# Patient Record
Sex: MALE | Race: BLACK OR AFRICAN AMERICAN | Employment: UNEMPLOYED | URBAN - METROPOLITAN AREA
[De-identification: names, ages, dates, MRNs, and addresses within clinical notes are randomized per-mention and may not be internally consistent; named-entity substitution may affect disease eponyms.]

---

## 2018-01-20 ENCOUNTER — APPOINTMENT (OUTPATIENT)
Dept: GENERAL RADIOLOGY | Age: 83
DRG: 557 | End: 2018-01-20
Payer: MEDICARE

## 2018-01-20 ENCOUNTER — HOSPITAL ENCOUNTER (INPATIENT)
Age: 83
LOS: 6 days | Discharge: NURSING, REHAB OR LONG-TERM CARE | DRG: 557 | End: 2018-01-26
Attending: EMERGENCY MEDICINE | Admitting: INTERNAL MEDICINE
Payer: MEDICARE

## 2018-01-20 ENCOUNTER — APPOINTMENT (OUTPATIENT)
Dept: CT IMAGING | Age: 83
DRG: 557 | End: 2018-01-20
Payer: MEDICARE

## 2018-01-20 DIAGNOSIS — M62.82 NON-TRAUMATIC RHABDOMYOLYSIS: Primary | ICD-10-CM

## 2018-01-20 PROBLEM — G93.40 ENCEPHALOPATHY: Status: ACTIVE | Noted: 2018-01-20

## 2018-01-20 PROBLEM — N17.9 ACUTE KIDNEY INJURY (HCC): Status: ACTIVE | Noted: 2018-01-20

## 2018-01-20 PROBLEM — J84.10 PULMONARY FIBROSIS (HCC): Status: ACTIVE | Noted: 2018-01-20

## 2018-01-20 LAB
% CKMB: 0.3 % (ref 0–3.5)
-: ABNORMAL
ABSOLUTE EOS #: 0 K/UL (ref 0–0.4)
ABSOLUTE IMMATURE GRANULOCYTE: ABNORMAL K/UL (ref 0–0.3)
ABSOLUTE LYMPH #: 0.3 K/UL (ref 1–4.8)
ABSOLUTE MONO #: 0.1 K/UL (ref 0.2–0.8)
AMMONIA: 29 UMOL/L (ref 16–60)
AMORPHOUS: ABNORMAL
ANION GAP SERPL CALCULATED.3IONS-SCNC: 18 MMOL/L (ref 9–17)
BACTERIA: ABNORMAL
BASOPHILS # BLD: 0 % (ref 0–2)
BASOPHILS ABSOLUTE: 0 K/UL (ref 0–0.2)
BILIRUBIN URINE: ABNORMAL
BNP INTERPRETATION: ABNORMAL
BUN BLDV-MCNC: 29 MG/DL (ref 8–23)
BUN/CREAT BLD: 20 (ref 9–20)
CALCIUM SERPL-MCNC: 8.9 MG/DL (ref 8.6–10.4)
CASTS UA: ABNORMAL /LPF
CHLORIDE BLD-SCNC: 100 MMOL/L (ref 98–107)
CK MB: 21 NG/ML
CKMB INTERPRETATION: ABNORMAL
CO2: 21 MMOL/L (ref 20–31)
COLOR: ABNORMAL
COMMENT UA: ABNORMAL
COMPLEMENT C3: 82 MG/DL (ref 90–180)
COMPLEMENT C4: 18 MG/DL (ref 10–40)
CREAT SERPL-MCNC: 1.47 MG/DL (ref 0.7–1.2)
CRYSTALS, UA: ABNORMAL /HPF
DIFFERENTIAL TYPE: ABNORMAL
DIRECT EXAM: NORMAL
EKG ATRIAL RATE: 101 BPM
EKG P AXIS: -8 DEGREES
EKG P-R INTERVAL: 120 MS
EKG Q-T INTERVAL: 332 MS
EKG QRS DURATION: 78 MS
EKG QTC CALCULATION (BAZETT): 430 MS
EKG R AXIS: 76 DEGREES
EKG T AXIS: 41 DEGREES
EKG VENTRICULAR RATE: 101 BPM
EOSINOPHILS RELATIVE PERCENT: 0 % (ref 1–4)
EPITHELIAL CELLS UA: ABNORMAL /HPF (ref 0–5)
FIO2: 26
FIO2: 28
GFR AFRICAN AMERICAN: 55 ML/MIN
GFR NON-AFRICAN AMERICAN: 45 ML/MIN
GFR SERPL CREATININE-BSD FRML MDRD: ABNORMAL ML/MIN/{1.73_M2}
GFR SERPL CREATININE-BSD FRML MDRD: ABNORMAL ML/MIN/{1.73_M2}
GLUCOSE BLD-MCNC: 116 MG/DL (ref 75–110)
GLUCOSE BLD-MCNC: 151 MG/DL (ref 70–99)
GLUCOSE URINE: NEGATIVE
HCO3 VENOUS: 20.7 MMOL/L (ref 24–30)
HCT VFR BLD CALC: 37.5 % (ref 41–53)
HEMOGLOBIN: 12.3 G/DL (ref 13.5–17.5)
IMMATURE GRANULOCYTES: ABNORMAL %
KETONES, URINE: NEGATIVE
LEUKOCYTE ESTERASE, URINE: NEGATIVE
LYMPHOCYTES # BLD: 11 % (ref 24–44)
Lab: NORMAL
MCH RBC QN AUTO: 31.3 PG (ref 26–34)
MCHC RBC AUTO-ENTMCNC: 32.7 G/DL (ref 31–37)
MCV RBC AUTO: 95.8 FL (ref 80–100)
MONOCYTES # BLD: 6 % (ref 1–7)
MUCUS: ABNORMAL
MYOGLOBIN: 3201 NG/ML (ref 28–72)
NEGATIVE BASE EXCESS, ART: 5 (ref 0–2)
NEGATIVE BASE EXCESS, VEN: 4 (ref 0–2)
NITRITE, URINE: NEGATIVE
NRBC AUTOMATED: ABNORMAL PER 100 WBC
O2 DEVICE/FLOW/%: ABNORMAL
O2 DEVICE/FLOW/%: ABNORMAL
O2 SAT, VEN: 80 %
OTHER OBSERVATIONS UA: ABNORMAL
PATIENT TEMP: 37
PATIENT TEMP: ABNORMAL
PCO2, VEN: 32 MM HG (ref 39–55)
PDW BLD-RTO: 14 % (ref 11.5–14.5)
PH UA: 5.5 (ref 5–8)
PH VENOUS: 7.42 (ref 7.32–7.42)
PLATELET # BLD: 170 K/UL (ref 130–400)
PLATELET ESTIMATE: ABNORMAL
PMV BLD AUTO: 7.4 FL (ref 6–12)
PO2, VEN: 43 MM HG (ref 30–50)
POC HCO3: 19.4 MMOL/L (ref 22–27)
POC O2 SATURATION: 96 %
POC PCO2 TEMP: ABNORMAL MM HG
POC PCO2 TEMP: ABNORMAL MM HG
POC PCO2: 29 MM HG (ref 32–45)
POC PH TEMP: ABNORMAL
POC PH TEMP: ABNORMAL
POC PH: 7.44 (ref 7.35–7.45)
POC PO2 TEMP: ABNORMAL MM HG
POC PO2 TEMP: ABNORMAL MM HG
POC PO2: 80 MM HG (ref 75–95)
POSITIVE BASE EXCESS, ART: ABNORMAL (ref 0–2)
POSITIVE BASE EXCESS, VEN: ABNORMAL (ref 0–2)
POTASSIUM SERPL-SCNC: 4.2 MMOL/L (ref 3.7–5.3)
PRO-BNP: 1166 PG/ML
PROCALCITONIN: 2.16 NG/ML
PROTEIN UA: ABNORMAL
RBC # BLD: 3.92 M/UL (ref 4.5–5.9)
RBC # BLD: ABNORMAL 10*6/UL
RBC UA: ABNORMAL /HPF (ref 0–2)
RENAL EPITHELIAL, UA: ABNORMAL /HPF
SEG NEUTROPHILS: 83 % (ref 36–66)
SEGMENTED NEUTROPHILS ABSOLUTE COUNT: 2.1 K/UL (ref 1.8–7.7)
SODIUM BLD-SCNC: 139 MMOL/L (ref 135–144)
SPECIFIC GRAVITY UA: 1.02 (ref 1–1.03)
SPECIMEN DESCRIPTION: NORMAL
STATUS: NORMAL
TCO2 (CALC), ART: 20 MMOL/L (ref 23–28)
TOTAL CK: 6491 U/L (ref 39–308)
TOTAL CO2, VENOUS: 22 MMOL/L (ref 25–31)
TRICHOMONAS: ABNORMAL
TROPONIN INTERP: NORMAL
TROPONIN T: <0.03 NG/ML
TSH SERPL DL<=0.05 MIU/L-ACNC: 0.35 MIU/L (ref 0.3–5)
TURBIDITY: ABNORMAL
URINE HGB: ABNORMAL
UROBILINOGEN, URINE: NORMAL
WBC # BLD: 2.5 K/UL (ref 3.5–11)
WBC # BLD: ABNORMAL 10*3/UL
WBC UA: ABNORMAL /HPF (ref 0–5)
YEAST: ABNORMAL

## 2018-01-20 PROCEDURE — 2580000003 HC RX 258: Performed by: EMERGENCY MEDICINE

## 2018-01-20 PROCEDURE — 94640 AIRWAY INHALATION TREATMENT: CPT

## 2018-01-20 PROCEDURE — 84156 ASSAY OF PROTEIN URINE: CPT

## 2018-01-20 PROCEDURE — 82550 ASSAY OF CK (CPK): CPT

## 2018-01-20 PROCEDURE — 36415 COLL VENOUS BLD VENIPUNCTURE: CPT

## 2018-01-20 PROCEDURE — 83516 IMMUNOASSAY NONANTIBODY: CPT

## 2018-01-20 PROCEDURE — 99285 EMERGENCY DEPT VISIT HI MDM: CPT

## 2018-01-20 PROCEDURE — 82803 BLOOD GASES ANY COMBINATION: CPT

## 2018-01-20 PROCEDURE — 96365 THER/PROPH/DIAG IV INF INIT: CPT

## 2018-01-20 PROCEDURE — 80048 BASIC METABOLIC PNL TOTAL CA: CPT

## 2018-01-20 PROCEDURE — 82140 ASSAY OF AMMONIA: CPT

## 2018-01-20 PROCEDURE — 72125 CT NECK SPINE W/O DYE: CPT

## 2018-01-20 PROCEDURE — 6370000000 HC RX 637 (ALT 250 FOR IP): Performed by: INTERNAL MEDICINE

## 2018-01-20 PROCEDURE — 93005 ELECTROCARDIOGRAM TRACING: CPT

## 2018-01-20 PROCEDURE — 82947 ASSAY GLUCOSE BLOOD QUANT: CPT

## 2018-01-20 PROCEDURE — 84443 ASSAY THYROID STIM HORMONE: CPT

## 2018-01-20 PROCEDURE — 51701 INSERT BLADDER CATHETER: CPT

## 2018-01-20 PROCEDURE — 6360000002 HC RX W HCPCS: Performed by: EMERGENCY MEDICINE

## 2018-01-20 PROCEDURE — 36600 WITHDRAWAL OF ARTERIAL BLOOD: CPT

## 2018-01-20 PROCEDURE — 71045 X-RAY EXAM CHEST 1 VIEW: CPT

## 2018-01-20 PROCEDURE — 86160 COMPLEMENT ANTIGEN: CPT

## 2018-01-20 PROCEDURE — 2060000000 HC ICU INTERMEDIATE R&B

## 2018-01-20 PROCEDURE — 2500000003 HC RX 250 WO HCPCS: Performed by: EMERGENCY MEDICINE

## 2018-01-20 PROCEDURE — 71250 CT THORAX DX C-: CPT

## 2018-01-20 PROCEDURE — 70450 CT HEAD/BRAIN W/O DYE: CPT

## 2018-01-20 PROCEDURE — 96367 TX/PROPH/DG ADDL SEQ IV INF: CPT

## 2018-01-20 PROCEDURE — 87804 INFLUENZA ASSAY W/OPTIC: CPT

## 2018-01-20 PROCEDURE — 84300 ASSAY OF URINE SODIUM: CPT

## 2018-01-20 PROCEDURE — 83880 ASSAY OF NATRIURETIC PEPTIDE: CPT

## 2018-01-20 PROCEDURE — 84145 PROCALCITONIN (PCT): CPT

## 2018-01-20 PROCEDURE — 84484 ASSAY OF TROPONIN QUANT: CPT

## 2018-01-20 PROCEDURE — 81001 URINALYSIS AUTO W/SCOPE: CPT

## 2018-01-20 PROCEDURE — 87086 URINE CULTURE/COLONY COUNT: CPT

## 2018-01-20 PROCEDURE — 82570 ASSAY OF URINE CREATININE: CPT

## 2018-01-20 PROCEDURE — 87040 BLOOD CULTURE FOR BACTERIA: CPT

## 2018-01-20 PROCEDURE — 86038 ANTINUCLEAR ANTIBODIES: CPT

## 2018-01-20 PROCEDURE — 83874 ASSAY OF MYOGLOBIN: CPT

## 2018-01-20 PROCEDURE — 85025 COMPLETE CBC W/AUTO DIFF WBC: CPT

## 2018-01-20 PROCEDURE — 99223 1ST HOSP IP/OBS HIGH 75: CPT | Performed by: INTERNAL MEDICINE

## 2018-01-20 PROCEDURE — 82553 CREATINE MB FRACTION: CPT

## 2018-01-20 RX ORDER — ALBUTEROL SULFATE 90 UG/1
2 AEROSOL, METERED RESPIRATORY (INHALATION)
Status: DISCONTINUED | OUTPATIENT
Start: 2018-01-20 | End: 2018-01-20

## 2018-01-20 RX ORDER — DORZOLAMIDE HYDROCHLORIDE AND TIMOLOL MALEATE 20; 5 MG/ML; MG/ML
1 SOLUTION/ DROPS OPHTHALMIC 3 TIMES DAILY
COMMUNITY

## 2018-01-20 RX ORDER — BRIMONIDINE TARTRATE 2 MG/ML
1 SOLUTION/ DROPS OPHTHALMIC 3 TIMES DAILY
COMMUNITY

## 2018-01-20 RX ORDER — 0.9 % SODIUM CHLORIDE 0.9 %
500 INTRAVENOUS SOLUTION INTRAVENOUS ONCE
Status: COMPLETED | OUTPATIENT
Start: 2018-01-20 | End: 2018-01-20

## 2018-01-20 RX ORDER — LISINOPRIL 20 MG/1
20 TABLET ORAL DAILY
COMMUNITY

## 2018-01-20 RX ORDER — IPRATROPIUM BROMIDE AND ALBUTEROL SULFATE 2.5; .5 MG/3ML; MG/3ML
1 SOLUTION RESPIRATORY (INHALATION)
Status: DISCONTINUED | OUTPATIENT
Start: 2018-01-20 | End: 2018-01-20

## 2018-01-20 RX ORDER — TIMOLOL MALEATE 5 MG/ML
1 SOLUTION/ DROPS OPHTHALMIC 3 TIMES DAILY
Status: DISCONTINUED | OUTPATIENT
Start: 2018-01-20 | End: 2018-01-26 | Stop reason: HOSPADM

## 2018-01-20 RX ORDER — ALBUTEROL SULFATE 2.5 MG/3ML
5 SOLUTION RESPIRATORY (INHALATION)
Status: DISCONTINUED | OUTPATIENT
Start: 2018-01-20 | End: 2018-01-21

## 2018-01-20 RX ORDER — DORZOLAMIDE HYDROCHLORIDE AND TIMOLOL MALEATE 20; 5 MG/ML; MG/ML
1 SOLUTION/ DROPS OPHTHALMIC 3 TIMES DAILY
Status: DISCONTINUED | OUTPATIENT
Start: 2018-01-20 | End: 2018-01-20 | Stop reason: CLARIF

## 2018-01-20 RX ORDER — BRIMONIDINE TARTRATE 2 MG/ML
1 SOLUTION/ DROPS OPHTHALMIC 3 TIMES DAILY
Status: DISCONTINUED | OUTPATIENT
Start: 2018-01-20 | End: 2018-01-26 | Stop reason: HOSPADM

## 2018-01-20 RX ORDER — ASPIRIN 81 MG/1
81 TABLET ORAL DAILY
COMMUNITY

## 2018-01-20 RX ORDER — TAMSULOSIN HYDROCHLORIDE 0.4 MG/1
0.4 CAPSULE ORAL DAILY
Status: DISCONTINUED | OUTPATIENT
Start: 2018-01-20 | End: 2018-01-26 | Stop reason: HOSPADM

## 2018-01-20 RX ORDER — LATANOPROST 50 UG/ML
1 SOLUTION/ DROPS OPHTHALMIC NIGHTLY
Status: DISCONTINUED | OUTPATIENT
Start: 2018-01-20 | End: 2018-01-26 | Stop reason: HOSPADM

## 2018-01-20 RX ORDER — ASPIRIN 81 MG/1
81 TABLET ORAL DAILY
Status: DISCONTINUED | OUTPATIENT
Start: 2018-01-20 | End: 2018-01-25

## 2018-01-20 RX ORDER — LATANOPROST 50 UG/ML
1 SOLUTION/ DROPS OPHTHALMIC NIGHTLY
COMMUNITY

## 2018-01-20 RX ORDER — TAMSULOSIN HYDROCHLORIDE 0.4 MG/1
0.4 CAPSULE ORAL DAILY
COMMUNITY

## 2018-01-20 RX ORDER — SODIUM CHLORIDE 9 MG/ML
INJECTION, SOLUTION INTRAVENOUS CONTINUOUS
Status: DISCONTINUED | OUTPATIENT
Start: 2018-01-20 | End: 2018-01-26

## 2018-01-20 RX ORDER — GUAIFENESIN 200 MG/1
400 TABLET ORAL 3 TIMES DAILY PRN
Status: ON HOLD | COMMUNITY
End: 2018-01-26 | Stop reason: HOSPADM

## 2018-01-20 RX ORDER — DORZOLAMIDE HCL 20 MG/ML
1 SOLUTION/ DROPS OPHTHALMIC 3 TIMES DAILY
Status: DISCONTINUED | OUTPATIENT
Start: 2018-01-20 | End: 2018-01-26 | Stop reason: HOSPADM

## 2018-01-20 RX ORDER — 0.9 % SODIUM CHLORIDE 0.9 %
1000 INTRAVENOUS SOLUTION INTRAVENOUS ONCE
Status: COMPLETED | OUTPATIENT
Start: 2018-01-20 | End: 2018-01-20

## 2018-01-20 RX ADMIN — SODIUM CHLORIDE 125 ML/HR: 9 INJECTION, SOLUTION INTRAVENOUS at 17:00

## 2018-01-20 RX ADMIN — ALBUTEROL SULFATE 2.5 MG: 5 SOLUTION RESPIRATORY (INHALATION) at 14:13

## 2018-01-20 RX ADMIN — VITAMIN D, TAB 1000IU (100/BT) 1000 UNITS: 25 TAB at 23:35

## 2018-01-20 RX ADMIN — LATANOPROST 1 DROP: 50 SOLUTION OPHTHALMIC at 23:40

## 2018-01-20 RX ADMIN — AZITHROMYCIN MONOHYDRATE 500 MG: 500 INJECTION, POWDER, LYOPHILIZED, FOR SOLUTION INTRAVENOUS at 16:08

## 2018-01-20 RX ADMIN — SODIUM CHLORIDE 1000 ML: 9 INJECTION, SOLUTION INTRAVENOUS at 15:02

## 2018-01-20 RX ADMIN — BRIMONIDINE TARTRATE 1 DROP: 2 SOLUTION OPHTHALMIC at 23:37

## 2018-01-20 RX ADMIN — ASPIRIN 81 MG: 81 TABLET, COATED ORAL at 23:35

## 2018-01-20 RX ADMIN — ALBUTEROL SULFATE 5 MG: 5 SOLUTION RESPIRATORY (INHALATION) at 14:04

## 2018-01-20 RX ADMIN — TAMSULOSIN HYDROCHLORIDE 0.4 MG: 0.4 CAPSULE ORAL at 23:34

## 2018-01-20 RX ADMIN — CEFTRIAXONE SODIUM 1 G: 10 INJECTION, POWDER, FOR SOLUTION INTRAVENOUS at 16:07

## 2018-01-20 RX ADMIN — TIMOLOL MALEATE 1 DROP: 5 SOLUTION OPHTHALMIC at 23:35

## 2018-01-20 RX ADMIN — DORZOLAMIDE HYDROCHLORIDE 1 DROP: 20 SOLUTION/ DROPS OPHTHALMIC at 23:38

## 2018-01-20 RX ADMIN — SODIUM CHLORIDE: 9 INJECTION, SOLUTION INTRAVENOUS at 21:24

## 2018-01-20 RX ADMIN — SODIUM CHLORIDE 500 ML: 0.9 INJECTION, SOLUTION INTRAVENOUS at 13:51

## 2018-01-20 ASSESSMENT — PAIN SCALES - GENERAL: PAINLEVEL_OUTOF10: 0

## 2018-01-20 NOTE — FLOWSHEET NOTE
Chaplain Clinton Olvera in ED. Pt laying in bed awoke. Pt was able to speak, state his name. His daughter Lashell Trivedi at Bedside. Daughter states she believers her father have pneumonia and wasn't doing good earlier at home.  prayed with Pt and daughter, provided ministry of active listening and presences.  shared scripture with Pt/dustyugher. Pt's daughter thanked . Spiritual Care will be available to offer emotional and spiritual support as needed. 01/20/18 1620   Encounter Summary   Services provided to: Patient and family together   Referral/Consult From: [address] (1.20.18)   Complexity of Encounter Moderate   Length of Encounter 15 minutes   Spiritual Assessment Completed Yes   Routine   Type Initial   Assessment Approachable;Calm; Hopeful;Coping   Intervention Active listening;Explored feelings, thoughts, concerns;Explored coping resources;Nurtured hope;Prayer;Sustaining presence/ Ministry of presence   Outcome Expressed gratitude;Engaged in conversation;Receptive;Comfort

## 2018-01-20 NOTE — ED TRIAGE NOTES
Pt to ED by ambulance, stretcher to rm 26. Pt sent from urgent care for hypotension. Pt AOx4. Pt went to urgent care for cough and being found on floor by daughter. On scene 80/50. On arrival to /70. C/o lethergy as well. Slight dyspnea, patent airway, no cyanosis noted. Bed to lowest position, side rails up x2, call light within reach. Daughter at bedside.

## 2018-01-20 NOTE — ED PROVIDER NOTES
[facility] ED  eMERGENCY dEPARTMENT eNCOUnter      Pt Name: Guicho Murrieta  MRN: [id number]  [place] 2/9/1930  Date of evaluation: 1/20/2018  Provider: Blayne Prieto MD    [address] [place] [address]       Chief Complaint   Patient presents with    Cough    Hypotension         HISTORY OF PRESENT ILLNESS   (Location/Symptom, Timing/Onset, Context/Setting, Quality, Duration, Modifying Factors, Severity)  Note limiting factors. Guicho Murrieta is a 80 y.o. male who presents to the emergency department By EMS for evaluation off cough and possible hypotension. Patient's daughter is the major historian. She states that he lives with her and had been coughing a lot yesterday but towards the evening she did not hear him cough. This morning when she walked into his bedroom he was laying on the floor. They took him to an urgent care center where he was found to be hypotensive and was directed to the emergency department. The history is provided by a relative, medical records and the EMS personnel. Nursing Notes were reviewed. REVIEW OF SYSTEMS    (2-9 systems for level 4, 10 or more for level 5)     Review of Systems   Unable to perform ROS: Mental status change       Except as noted above the remainder of the review of systems was reviewed and negative. PAST MEDICAL HISTORY     Past Medical History:   Diagnosis Date    Cerebral artery occlusion with cerebral infarction ([facility] [address]     tia    Diabetes mellitus ([facility] [address]     Glaucoma     Hypertension          SURGICAL HISTORY       Past Surgical History:   Procedure Laterality Date    EYE SURGERY           CURRENT MEDICATIONS       Previous Medications    ASPIRIN 81 MG EC TABLET    Take 81 mg by mouth daily    BRIMONIDINE (ALPHAGAN) 0.2 % OPHTHALMIC SOLUTION    Place 1 drop into the left eye 3 times daily    CAMPHOR-MENTHOL (SARNA) 0.5-0.5 % LOTION    Apply topically 3 times daily as needed for Itching Apply topically as needed.     DORZOLAMIDE-TIMOLOL (COSOPT) DIFFERENTIAL - Abnormal; Notable for the following:     WBC 2.5 (*)     RBC 3.92 (*)     Hemoglobin 12.3 (*)     Hematocrit 37.5 (*)     Seg Neutrophils 83 (*)     Lymphocytes 11 (*)     Eosinophils % 0 (*)     Absolute Lymph # 0.30 (*)     Absolute Mono # 0.10 (*)     All other components within normal limits   BRAIN NATRIURETIC PEPTIDE - Abnormal; Notable for the following:     Pro-BNP 1,166 (*)     All other components within normal limits   MYOGLOBIN, SERUM - Abnormal; Notable for the following:     Myoglobin 3,201 (*)     All other components within normal limits   CK ISOENZYMES - Abnormal; Notable for the following: Total CK 6,491 (*)     CK-MB 21.0 (*)     All other components within normal limits   PROCALCITONIN - Abnormal; Notable for the following:     Procalcitonin 2.16 (*)     All other components within normal limits   POC PANEL (G3)-JUANY - Abnormal; Notable for the following:     pCO2, Juany 32 (*)     Total CO2, Venous 22 (*)     HCO3, Venous 20.7 (*)     Negative Base Excess, Juany 4 (*)     All other components within normal limits   RAPID INFLUENZA A/B ANTIGENS   CULTURE BLOOD #1   CULTURE BLOOD #1   TROPONIN   AMMONIA   UA W/REFLEX CULTURE       All other labs were within normal range or not returned as of this dictation. EMERGENCY DEPARTMENT COURSE and DIFFERENTIAL DIAGNOSIS/MDM:   Vitals:    Vitals:    01/20/18 1326 01/20/18 1448   BP: 139/71 (!) 105/58   Pulse: 95 123   Resp: 24 18   Temp: 97.7 °F (36.5 °C) 100 °F (37.8 °C)   TempSrc: Oral Rectal   SpO2: 90% 98%   Weight: 147 lb (66.7 kg)        Chest x-ray is consistent with pulmonary fibrosis which shows up on the CT scan no infiltrate is reported. CT scan of the head and cervical spine did not reveal any acute findings. Patient's myoglobin and CPK levels are elevated consistent with skeletal muscle injury and he is started on IV fluid hydration. The plan is to use incentive for the catheter to closely monitor his urine output. Addis Méndez MD  01/20/18 5509

## 2018-01-20 NOTE — H&P
Department of Internal Medicine  Attending History and Physical        CHIEF COMPLAINT: cough    History Obtained From:  Patient/records    HISTORY OF PRESENT ILLNESS:    Patient presented to the ER and was subsequently admitted for rhabdomyolysis, MAXINE, pulmonary fibrosis. Pt states that he had been episodes of confusion and when I asked that he was found on the floor by family member, he could not recall what happened. He does state that he did not have any chest pains or palpitations and denied any headaches. Pt states that he has had episodes of confusion for quiet sometime. Also knows that he has had chronic lung problems but not aware of diagnosis. Past Medical/Surgical History:  Past Medical History:   Diagnosis Date    Cerebral artery occlusion with cerebral infarction ([facility] [address]     tia    Diabetes mellitus ([facility] [address]     Glaucoma     Hypertension          Past Surgical History:   Procedure Laterality Date    EYE SURGERY         No current facility-administered medications on file prior to encounter. No current outpatient prescriptions on file prior to encounter. Allergies:  Review of patient's allergies indicates no known allergies. Social History:   Social History     Social History    Marital status: Single     Spouse name: N/A    Number of children: N/A    Years of education: N/A     Occupational History    Not on file. Social History Main Topics    Smoking status: Former Smoker    Smokeless tobacco: Never Used    Alcohol use No    Drug use: No    Sexual activity: Not on file     Other Topics Concern    Not on file     Social History Narrative    No narrative on file       Family History:   Patient unable to provide. REVIEW OF SYSTEMS:  Patient unable to provide meaningful information. PHYSICAL EXAM:  Vitals:  BP (!) 105/58   Pulse 123   Temp 100 °F (37.8 °C) (Rectal)   Resp 18   Wt 147 lb (66.7 kg)   SpO2 98%     General Appearance: somewhat confused.  Keeps

## 2018-01-21 LAB
ALPHA-1 ANTITRYPSIN: 150 MG/DL (ref 90–200)
ANION GAP SERPL CALCULATED.3IONS-SCNC: 13 MMOL/L (ref 9–17)
BUN BLDV-MCNC: 29 MG/DL (ref 8–23)
BUN/CREAT BLD: 19 (ref 9–20)
CALCIUM SERPL-MCNC: 7.8 MG/DL (ref 8.6–10.4)
CHLORIDE BLD-SCNC: 106 MMOL/L (ref 98–107)
CO2: 20 MMOL/L (ref 20–31)
CREAT SERPL-MCNC: 1.53 MG/DL (ref 0.7–1.2)
CREATININE URINE: 225.4 MG/DL (ref 39–259)
CULTURE: NO GROWTH
CULTURE: NORMAL
GFR AFRICAN AMERICAN: 52 ML/MIN
GFR NON-AFRICAN AMERICAN: 43 ML/MIN
GFR SERPL CREATININE-BSD FRML MDRD: ABNORMAL ML/MIN/{1.73_M2}
GFR SERPL CREATININE-BSD FRML MDRD: ABNORMAL ML/MIN/{1.73_M2}
GLUCOSE BLD-MCNC: 107 MG/DL (ref 70–99)
Lab: NORMAL
MYOGLOBIN: 1675 NG/ML (ref 28–72)
POTASSIUM SERPL-SCNC: 4 MMOL/L (ref 3.7–5.3)
SODIUM BLD-SCNC: 139 MMOL/L (ref 135–144)
SODIUM,UR: 30 MMOL/L
SPECIMEN DESCRIPTION: NORMAL
SPECIMEN DESCRIPTION: NORMAL
STATUS: NORMAL
TOTAL CK: 4189 U/L (ref 39–308)
TOTAL PROTEIN, URINE: 128 MG/DL

## 2018-01-21 PROCEDURE — 6370000000 HC RX 637 (ALT 250 FOR IP): Performed by: NURSE PRACTITIONER

## 2018-01-21 PROCEDURE — 82103 ALPHA-1-ANTITRYPSIN TOTAL: CPT

## 2018-01-21 PROCEDURE — 82104 ALPHA-1-ANTITRYPSIN PHENO: CPT

## 2018-01-21 PROCEDURE — 99231 SBSQ HOSP IP/OBS SF/LOW 25: CPT | Performed by: INTERNAL MEDICINE

## 2018-01-21 PROCEDURE — 6360000002 HC RX W HCPCS: Performed by: NURSE PRACTITIONER

## 2018-01-21 PROCEDURE — 36415 COLL VENOUS BLD VENIPUNCTURE: CPT

## 2018-01-21 PROCEDURE — 2580000003 HC RX 258: Performed by: NURSE PRACTITIONER

## 2018-01-21 PROCEDURE — 2580000003 HC RX 258: Performed by: INTERNAL MEDICINE

## 2018-01-21 PROCEDURE — 94760 N-INVAS EAR/PLS OXIMETRY 1: CPT

## 2018-01-21 PROCEDURE — 83874 ASSAY OF MYOGLOBIN: CPT

## 2018-01-21 PROCEDURE — 82550 ASSAY OF CK (CPK): CPT

## 2018-01-21 PROCEDURE — 6370000000 HC RX 637 (ALT 250 FOR IP): Performed by: INTERNAL MEDICINE

## 2018-01-21 PROCEDURE — 94640 AIRWAY INHALATION TREATMENT: CPT

## 2018-01-21 PROCEDURE — 2060000000 HC ICU INTERMEDIATE R&B

## 2018-01-21 PROCEDURE — 80048 BASIC METABOLIC PNL TOTAL CA: CPT

## 2018-01-21 RX ORDER — ALBUTEROL SULFATE 2.5 MG/3ML
2.5 SOLUTION RESPIRATORY (INHALATION)
Status: DISCONTINUED | OUTPATIENT
Start: 2018-01-21 | End: 2018-01-21

## 2018-01-21 RX ORDER — CODEINE PHOSPHATE AND GUAIFENESIN 10; 100 MG/5ML; MG/5ML
5 SOLUTION ORAL EVERY 4 HOURS PRN
Status: DISCONTINUED | OUTPATIENT
Start: 2018-01-21 | End: 2018-01-26 | Stop reason: HOSPADM

## 2018-01-21 RX ORDER — ALBUTEROL SULFATE 2.5 MG/3ML
2.5 SOLUTION RESPIRATORY (INHALATION) EVERY 6 HOURS PRN
Status: DISCONTINUED | OUTPATIENT
Start: 2018-01-21 | End: 2018-01-26 | Stop reason: HOSPADM

## 2018-01-21 RX ADMIN — CEFTRIAXONE SODIUM 1 G: 10 INJECTION, POWDER, FOR SOLUTION INTRAVENOUS at 21:07

## 2018-01-21 RX ADMIN — VITAMIN D, TAB 1000IU (100/BT) 1000 UNITS: 25 TAB at 09:39

## 2018-01-21 RX ADMIN — BRIMONIDINE TARTRATE 1 DROP: 2 SOLUTION OPHTHALMIC at 21:14

## 2018-01-21 RX ADMIN — DORZOLAMIDE HYDROCHLORIDE 1 DROP: 20 SOLUTION/ DROPS OPHTHALMIC at 21:14

## 2018-01-21 RX ADMIN — DORZOLAMIDE HYDROCHLORIDE 1 DROP: 20 SOLUTION/ DROPS OPHTHALMIC at 09:39

## 2018-01-21 RX ADMIN — BRIMONIDINE TARTRATE 1 DROP: 2 SOLUTION OPHTHALMIC at 06:30

## 2018-01-21 RX ADMIN — MOMETASONE FUROATE AND FORMOTEROL FUMARATE DIHYDRATE 2 PUFF: 200; 5 AEROSOL RESPIRATORY (INHALATION) at 21:05

## 2018-01-21 RX ADMIN — GUAIFENESIN AND CODEINE PHOSPHATE 5 ML: 10; 100 LIQUID ORAL at 17:40

## 2018-01-21 RX ADMIN — AZITHROMYCIN MONOHYDRATE 500 MG: 500 INJECTION, POWDER, LYOPHILIZED, FOR SOLUTION INTRAVENOUS at 17:39

## 2018-01-21 RX ADMIN — TIMOLOL MALEATE 1 DROP: 5 SOLUTION OPHTHALMIC at 21:14

## 2018-01-21 RX ADMIN — TIMOLOL MALEATE 1 DROP: 5 SOLUTION OPHTHALMIC at 14:15

## 2018-01-21 RX ADMIN — TIMOLOL MALEATE 1 DROP: 5 SOLUTION OPHTHALMIC at 09:39

## 2018-01-21 RX ADMIN — LATANOPROST 1 DROP: 50 SOLUTION OPHTHALMIC at 21:14

## 2018-01-21 RX ADMIN — TAMSULOSIN HYDROCHLORIDE 0.4 MG: 0.4 CAPSULE ORAL at 09:38

## 2018-01-21 RX ADMIN — ASPIRIN 81 MG: 81 TABLET, COATED ORAL at 09:38

## 2018-01-21 RX ADMIN — SODIUM CHLORIDE: 9 INJECTION, SOLUTION INTRAVENOUS at 09:49

## 2018-01-21 RX ADMIN — DORZOLAMIDE HYDROCHLORIDE 1 DROP: 20 SOLUTION/ DROPS OPHTHALMIC at 14:15

## 2018-01-21 RX ADMIN — BRIMONIDINE TARTRATE 1 DROP: 2 SOLUTION OPHTHALMIC at 14:15

## 2018-01-21 RX ADMIN — SODIUM CHLORIDE: 9 INJECTION, SOLUTION INTRAVENOUS at 17:40

## 2018-01-21 NOTE — PROGRESS NOTES
Telephone call from Dr. Kirit Vines.  Confirmed that bladder scan is not necessary as pt has vivar in place.

## 2018-01-21 NOTE — CONSULTS
Pulmonary Medicine and [address] Karthik, BRYCE / Dr. Inna Pastrana M.D. Patient Barb Marr   MRN -  [id number]   Acct # - [de-identified]   - 1930      Date of Admission -  2018  1:25 PM  Date of evaluation -  2018  Room - [facility] [address], MD Primary Care Physician - No primary care provider on file. Reason for Consult    Chronic Respiratory failure / Pulmonary Fibrosis / Emphysema     Assessment   · Chronic respiratory failure  · COPD/Severe bolus emphysema  · Pulmonary fibrosis  · MAXINE on CKD  · Rhabdomyolysis  · HTN, DM II    Recommendations   · 2 liters/min via nasal cannula  · Continue IV antibiotics  · Albuterol and Ipratropium Q 4 hours and prn  · Dulera 200  · Robitussin for cough   · Check Alpha 1 with phenotype   · X-ray chest in am  · Labs: CBC and BMP in am  · Nephrology following  · DVT prophylaxis with low molecular weight heparin  · Home O2 evaluation prior to discharge  · Will follow with you    Problem List      Patient Active Problem List   Diagnosis    Rhabdomyolysis    Pulmonary fibrosis ([facility] [address]    Acute kidney injury ([facility] [address]    Encephalopathy       WARREN Ram is 80 y.o.,  male, admitted after being discovered on the floor by his daughter. He has some confusion at this time, therefore information is primarily obtained from the patient chart and his daughter is at the bedside. He has had an increased cough for quite some time with large amount of sputum production. He denies any significant shortness of breath. He denies chest pain. CT of the shot significant for severe pulmonary fibrosis/severe bolus emphysema. According to the patient and his daughter, he has minimal smoking history, smoking only while he was in the service and quitting over 60 years ago. 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   It is unclear if he has been on home oxygen before, though a second daughter tells me on the phone that he has had oxygen at home before. He currently does not follow with a pulmonologist.    PMHx   Past Medical History      Diagnosis Date    Cerebral artery occlusion with cerebral infarction ([facility] [address]     tia    Diabetes mellitus ([facility] [address]     Glaucoma     Hypertension       Past Surgical History        Procedure Laterality Date    EYE SURGERY         Meds    Current Medications    brimonidine  1 drop Left Eye TID    aspirin  81 mg Oral Daily    latanoprost  1 drop Both Eyes Nightly    tamsulosin  0.4 mg Oral Daily    vitamin D  1,000 Units Oral Daily    dorzolamide  1 drop Both Eyes TID    And    timolol  1 drop Both Eyes TID       IV Drips/Infusions   sodium chloride 100 mL/hr at 01/21/18 1339     Home Medications  Prescriptions Prior to Admission: lisinopril (PRINIVIL;ZESTRIL) 20 MG tablet, Take 20 mg by mouth daily  aspirin 81 MG EC tablet, Take 81 mg by mouth daily  guaiFENesin 200 MG tablet, Take 400 mg by mouth 3 times daily as needed for Congestion  brimonidine (ALPHAGAN) 0.2 % ophthalmic solution, Place 1 drop into the left eye 3 times daily  dorzolamide-timolol (COSOPT) 22.3-6.8 MG/ML ophthalmic solution, Place 1 drop into both eyes 3 times daily  latanoprost (XALATAN) 0.005 % ophthalmic solution, Place 1 drop into both eyes nightly  tamsulosin (FLOMAX) 0.4 MG capsule, Take 0.4 mg by mouth daily  camphor-menthol (SARNA) 0.5-0.5 % lotion, Apply topically 3 times daily as needed for Itching Apply topically as needed. vitamin D (CHOLECALCIFEROL) 1000 UNIT TABS tablet, Take 1,000 Units by mouth daily    Allergies    Review of patient's allergies indicates no known allergies.   Social History     Social History     Social History    Marital status: Single     Spouse name: N/A    Number of children: 11    Years of education: 8     Occupational History    retired      Social History Main Topics    Smoking status: Former Smoker    Smokeless tobacco: records and notes have been reviewed in [facility]   CBC   Lab Results   Component Value Date    WBC 2.5 01/20/2018    RBC 3.92 01/20/2018    HGB 12.3 01/20/2018    HCT 37.5 01/20/2018     01/20/2018    MCV 95.8 01/20/2018    MCH 31.3 01/20/2018    MCHC 32.7 01/20/2018    RDW 14.0 01/20/2018    LYMPHOPCT 11 01/20/2018    MONOPCT 6 01/20/2018    BASOPCT 0 01/20/2018    MONOSABS 0.10 01/20/2018    LYMPHSABS 0.30 01/20/2018    EOSABS 0.00 01/20/2018    BASOSABS 0.00 01/20/2018    DIFFTYPE NOT REPORTED 01/20/2018     BMP Lab Results   Component Value Date     01/21/2018    K 4.0 01/21/2018     01/21/2018    CO2 20 01/21/2018    BUN 29 01/21/2018    CREATININE 1.53 01/21/2018    GLUCOSE 107 01/21/2018    CALCIUM 7.8 01/21/2018         Radiology    CXR       CT Scans      (See actual reports for details)    \"Thank you for asking us to see this patient\"    Case discussed with nurse and patient/family. Questions and concerns addressed. Electronically signed by     Laura Lovell CNP on 1/21/2018 at 3:16 PM  Patient was seen under the supervision of Dr. Claudean Irvine and Sleep Medicine,    Patient seen and evaluated by me. He is 80years old black male with history of atherosclerotic artery vessel disease/diabetes mellitus/hypertension who was admitted for cough/rhabdomyolysis. He denies chest pain, fever, hemoptysis. He has smoked briefly in the past and also has worked in the uninfected for long time. Lung exam reveals moderate air exchange with bilateral basal crackles. But no significant wheeze. CT scan report and films were reviewed. Plan is him to continue IV antibiotics, bronchodilators, Dulera. Obtain alpha-1 antitrypsin level. He'll benefit from home oxygen evaluation of the time of discharge. Plan was discussed with patient and his daughter. Above was reviewed and discussed with Lyndon Adam CNP. And I agree with assessment and plan of care.   Electronically

## 2018-01-21 NOTE — CONSULTS
NOSE:  No nasal discharge. THROAT:  Oral cavity and pharynx normal. Moist  CARDIAC: Normal S1 and S2. No S3, S4 or murmurs. Rhythm is regular. LUNGS: Positive rhonchi, wheezing  diminished breath sounds. NECK: Neck supple, non-tender without lymphadenopathy, masses or thyromegaly. JVD-neg  BACK: Examination of the spine reveals normal gait and posture, no spinal deformity, symmetry of spinal muscles, without tenderness, decreased range of motion or muscular spasm  MUSKULOSKELETAL: Adequately aligned spine. No joint erythema or tenderness. EXTREMITIES: No edema. Peripheral pulses intact. NEURO: Nonfocal      Labs:   CBC:  Recent Labs      01/20/18   1400   WBC  2.5*   RBC  3.92*   HGB  12.3*   HCT  37.5*   MCV  95.8   MCH  31.3   MCHC  32.7   RDW  14.0   PLT  170   MPV  7.4      BMP: Recent Labs      01/20/18   1400  01/21/18   0439   NA  139  139   K  4.2  4.0   CL  100  106   CO2  21  20   BUN  29*  29*   CREATININE  1.47*  1.53*   GLUCOSE  151*  107*   CALCIUM  8.9  7.8*      Phosphorus:  No results for input(s): PHOS in the last 72 hours. Magnesium: No results for input(s): MG in the last 72 hours. Albumin: No results for input(s): LABALBU in the last 72 hours. IRON:  No results for input(s): IRON in the last 72 hours. Iron Saturation:  Invalid input(s): PERCENTFE  TIBC:  No results for input(s): TIBC in the last 72 hours. FERRITIN:  No results for input(s): FERRITIN in the last 72 hours. SPEP: No results for input(s): SPEP in the last 72 hours. No results for input(s): PROT, ALBCAL, ALBCAL, ALBPCT, LABALPH, A1PCT, LABALPH, A2PCT, LABBETA, BETAPCT, GAMGLOB, GGPCT, PATH in the last 72 hours. UPEP: No results for input(s): TPU in the last 72 hours. Urine Sodium:    Recent Labs      01/20/18   2354   TEOFILO  30      Urine Potassium: No results for input(s): KUR in the last 72 hours. Urine Chloride:   Invalid input(s): CLU  Urine Ph:  Invalid input(s): PO4U  Urine Osmolarity: No results for

## 2018-01-22 ENCOUNTER — APPOINTMENT (OUTPATIENT)
Dept: MRI IMAGING | Age: 83
DRG: 557 | End: 2018-01-22
Payer: MEDICARE

## 2018-01-22 ENCOUNTER — APPOINTMENT (OUTPATIENT)
Dept: GENERAL RADIOLOGY | Age: 83
DRG: 557 | End: 2018-01-22
Payer: MEDICARE

## 2018-01-22 ENCOUNTER — APPOINTMENT (OUTPATIENT)
Dept: ULTRASOUND IMAGING | Age: 83
DRG: 557 | End: 2018-01-22
Payer: MEDICARE

## 2018-01-22 PROBLEM — G93.89 ENCEPHALOMALACIA ON IMAGING STUDY: Status: ACTIVE | Noted: 2018-01-22

## 2018-01-22 LAB
ABSOLUTE EOS #: 0.07 K/UL (ref 0–0.4)
ABSOLUTE IMMATURE GRANULOCYTE: ABNORMAL K/UL (ref 0–0.3)
ABSOLUTE LYMPH #: 0.49 K/UL (ref 1–4.8)
ABSOLUTE MONO #: 0.22 K/UL (ref 0.2–0.8)
ABSOLUTE RETIC #: 0.02 M/UL (ref 0.03–0.08)
ANION GAP SERPL CALCULATED.3IONS-SCNC: 10 MMOL/L (ref 9–17)
ANTI-NUCLEAR ANTIBODY (ANA): POSITIVE
BASOPHILS # BLD: 0 %
BASOPHILS ABSOLUTE: 0 K/UL (ref 0–0.2)
BUN BLDV-MCNC: 26 MG/DL (ref 8–23)
BUN/CREAT BLD: 20 (ref 9–20)
CALCIUM IONIZED: 1.17 MMOL/L (ref 1.13–1.33)
CALCIUM SERPL-MCNC: 7.5 MG/DL (ref 8.6–10.4)
CHLORIDE BLD-SCNC: 107 MMOL/L (ref 98–107)
CO2: 19 MMOL/L (ref 20–31)
CREAT SERPL-MCNC: 1.31 MG/DL (ref 0.7–1.2)
DIFFERENTIAL TYPE: ABNORMAL
EOSINOPHILS RELATIVE PERCENT: 5 % (ref 1–4)
FERRITIN: 788 UG/L (ref 30–400)
FOLATE: 14.2 NG/ML
FREE KAPPA/LAMBDA RATIO: 7.15 (ref 0.26–1.65)
GFR AFRICAN AMERICAN: >60 ML/MIN
GFR NON-AFRICAN AMERICAN: 52 ML/MIN
GFR SERPL CREATININE-BSD FRML MDRD: ABNORMAL ML/MIN/{1.73_M2}
GFR SERPL CREATININE-BSD FRML MDRD: ABNORMAL ML/MIN/{1.73_M2}
GLUCOSE BLD-MCNC: 106 MG/DL (ref 70–99)
HCT VFR BLD CALC: 27.9 % (ref 41–53)
HEMOGLOBIN: 9.5 G/DL (ref 13.5–17.5)
HIV AG/AB: NONREACTIVE
IGA: 159 MG/DL (ref 70–400)
IGG: 886 MG/DL (ref 700–1600)
IGM: 82 MG/DL (ref 40–230)
IMMATURE GRANULOCYTES: ABNORMAL %
IMMATURE RETIC FRACT: 5.5 % (ref 2.7–18.3)
IRON SATURATION: 28 % (ref 20–55)
IRON: 27 UG/DL (ref 59–158)
KAPPA FREE LIGHT CHAINS QNT: 26.04 MG/DL (ref 0.37–1.94)
LACTATE DEHYDROGENASE: 403 U/L (ref 135–225)
LAMBDA FREE LIGHT CHAINS QNT: 3.64 MG/DL (ref 0.57–2.63)
LV EF: 60 %
LVEF MODALITY: NORMAL
LYMPHOCYTES # BLD: 35 % (ref 24–44)
MCH RBC QN AUTO: 32 PG (ref 26–34)
MCHC RBC AUTO-ENTMCNC: 34 G/DL (ref 31–37)
MCV RBC AUTO: 93.9 FL (ref 80–100)
MONOCYTES # BLD: 16 % (ref 1–7)
MYOGLOBIN: 575 NG/ML (ref 28–72)
NRBC AUTOMATED: ABNORMAL PER 100 WBC
PDW BLD-RTO: 13 % (ref 11.5–14.5)
PLATELET # BLD: 167 K/UL (ref 130–400)
PLATELET ESTIMATE: ABNORMAL
PMV BLD AUTO: ABNORMAL FL (ref 6–12)
POTASSIUM SERPL-SCNC: 4.1 MMOL/L (ref 3.7–5.3)
RBC # BLD: 2.97 M/UL (ref 4.5–5.9)
RBC # BLD: ABNORMAL 10*6/UL
RETIC %: 0.5 % (ref 0.5–1.9)
RETIC HEMOGLOBIN: 28.1 PG (ref 28.2–35.7)
SEG NEUTROPHILS: 44 % (ref 36–66)
SEGMENTED NEUTROPHILS ABSOLUTE COUNT: 0.62 K/UL (ref 1.8–7.7)
SODIUM BLD-SCNC: 136 MMOL/L (ref 135–144)
TOTAL CK: 3597 U/L (ref 39–308)
TOTAL IRON BINDING CAPACITY: 97 UG/DL (ref 250–450)
UNSATURATED IRON BINDING CAPACITY: 70 UG/DL (ref 112–347)
VITAMIN B-12: 624 PG/ML (ref 232–1245)
WBC # BLD: 1.4 K/UL (ref 3.5–11)
WBC # BLD: ABNORMAL 10*3/UL

## 2018-01-22 PROCEDURE — 83550 IRON BINDING TEST: CPT

## 2018-01-22 PROCEDURE — 82784 ASSAY IGA/IGD/IGG/IGM EACH: CPT

## 2018-01-22 PROCEDURE — 6370000000 HC RX 637 (ALT 250 FOR IP): Performed by: NURSE PRACTITIONER

## 2018-01-22 PROCEDURE — 85045 AUTOMATED RETICULOCYTE COUNT: CPT

## 2018-01-22 PROCEDURE — 99222 1ST HOSP IP/OBS MODERATE 55: CPT | Performed by: INTERNAL MEDICINE

## 2018-01-22 PROCEDURE — 2700000000 HC OXYGEN THERAPY PER DAY

## 2018-01-22 PROCEDURE — 99233 SBSQ HOSP IP/OBS HIGH 50: CPT | Performed by: INTERNAL MEDICINE

## 2018-01-22 PROCEDURE — 36415 COLL VENOUS BLD VENIPUNCTURE: CPT

## 2018-01-22 PROCEDURE — 93306 TTE W/DOPPLER COMPLETE: CPT

## 2018-01-22 PROCEDURE — 94640 AIRWAY INHALATION TREATMENT: CPT

## 2018-01-22 PROCEDURE — 82746 ASSAY OF FOLIC ACID SERUM: CPT

## 2018-01-22 PROCEDURE — 83883 ASSAY NEPHELOMETRY NOT SPEC: CPT

## 2018-01-22 PROCEDURE — 6360000002 HC RX W HCPCS: Performed by: NURSE PRACTITIONER

## 2018-01-22 PROCEDURE — 93880 EXTRACRANIAL BILAT STUDY: CPT

## 2018-01-22 PROCEDURE — 70553 MRI BRAIN STEM W/O & W/DYE: CPT

## 2018-01-22 PROCEDURE — 2580000003 HC RX 258: Performed by: PSYCHIATRY & NEUROLOGY

## 2018-01-22 PROCEDURE — 76775 US EXAM ABDO BACK WALL LIM: CPT

## 2018-01-22 PROCEDURE — 86334 IMMUNOFIX E-PHORESIS SERUM: CPT

## 2018-01-22 PROCEDURE — 83540 ASSAY OF IRON: CPT

## 2018-01-22 PROCEDURE — 2500000003 HC RX 250 WO HCPCS: Performed by: NURSE PRACTITIONER

## 2018-01-22 PROCEDURE — 2060000000 HC ICU INTERMEDIATE R&B

## 2018-01-22 PROCEDURE — 82607 VITAMIN B-12: CPT

## 2018-01-22 PROCEDURE — A9579 GAD-BASE MR CONTRAST NOS,1ML: HCPCS | Performed by: PSYCHIATRY & NEUROLOGY

## 2018-01-22 PROCEDURE — 83615 LACTATE (LD) (LDH) ENZYME: CPT

## 2018-01-22 PROCEDURE — 82728 ASSAY OF FERRITIN: CPT

## 2018-01-22 PROCEDURE — 80048 BASIC METABOLIC PNL TOTAL CA: CPT

## 2018-01-22 PROCEDURE — 83874 ASSAY OF MYOGLOBIN: CPT

## 2018-01-22 PROCEDURE — 86747 PARVOVIRUS ANTIBODY: CPT

## 2018-01-22 PROCEDURE — 71045 X-RAY EXAM CHEST 1 VIEW: CPT

## 2018-01-22 PROCEDURE — 84155 ASSAY OF PROTEIN SERUM: CPT

## 2018-01-22 PROCEDURE — 86225 DNA ANTIBODY NATIVE: CPT

## 2018-01-22 PROCEDURE — 82550 ASSAY OF CK (CPK): CPT

## 2018-01-22 PROCEDURE — 2580000003 HC RX 258: Performed by: NURSE PRACTITIONER

## 2018-01-22 PROCEDURE — 85025 COMPLETE CBC W/AUTO DIFF WBC: CPT

## 2018-01-22 PROCEDURE — 6370000000 HC RX 637 (ALT 250 FOR IP): Performed by: INTERNAL MEDICINE

## 2018-01-22 PROCEDURE — 6360000004 HC RX CONTRAST MEDICATION: Performed by: PSYCHIATRY & NEUROLOGY

## 2018-01-22 PROCEDURE — 82330 ASSAY OF CALCIUM: CPT

## 2018-01-22 PROCEDURE — 95816 EEG AWAKE AND DROWSY: CPT

## 2018-01-22 PROCEDURE — 2580000003 HC RX 258: Performed by: INTERNAL MEDICINE

## 2018-01-22 PROCEDURE — 87389 HIV-1 AG W/HIV-1&-2 AB AG IA: CPT

## 2018-01-22 PROCEDURE — 86021 WBC ANTIBODY IDENTIFICATION: CPT

## 2018-01-22 PROCEDURE — 84165 PROTEIN E-PHORESIS SERUM: CPT

## 2018-01-22 RX ORDER — ACETAMINOPHEN 325 MG/1
650 TABLET ORAL EVERY 4 HOURS PRN
Status: DISCONTINUED | OUTPATIENT
Start: 2018-01-22 | End: 2018-01-26 | Stop reason: HOSPADM

## 2018-01-22 RX ORDER — SODIUM CHLORIDE 0.9 % (FLUSH) 0.9 %
10 SYRINGE (ML) INJECTION
Status: COMPLETED | OUTPATIENT
Start: 2018-01-22 | End: 2018-01-22

## 2018-01-22 RX ADMIN — BRIMONIDINE TARTRATE 1 DROP: 2 SOLUTION OPHTHALMIC at 09:25

## 2018-01-22 RX ADMIN — LATANOPROST 1 DROP: 50 SOLUTION OPHTHALMIC at 20:53

## 2018-01-22 RX ADMIN — TIMOLOL MALEATE 1 DROP: 5 SOLUTION OPHTHALMIC at 20:53

## 2018-01-22 RX ADMIN — SODIUM CHLORIDE: 9 INJECTION, SOLUTION INTRAVENOUS at 04:15

## 2018-01-22 RX ADMIN — TAMSULOSIN HYDROCHLORIDE 0.4 MG: 0.4 CAPSULE ORAL at 09:11

## 2018-01-22 RX ADMIN — SODIUM CHLORIDE: 9 INJECTION, SOLUTION INTRAVENOUS at 18:15

## 2018-01-22 RX ADMIN — MOMETASONE FUROATE AND FORMOTEROL FUMARATE DIHYDRATE 2 PUFF: 200; 5 AEROSOL RESPIRATORY (INHALATION) at 09:53

## 2018-01-22 RX ADMIN — DORZOLAMIDE HYDROCHLORIDE 1 DROP: 20 SOLUTION/ DROPS OPHTHALMIC at 20:53

## 2018-01-22 RX ADMIN — GUAIFENESIN AND CODEINE PHOSPHATE 5 ML: 10; 100 LIQUID ORAL at 09:36

## 2018-01-22 RX ADMIN — TIMOLOL MALEATE 1 DROP: 5 SOLUTION OPHTHALMIC at 09:11

## 2018-01-22 RX ADMIN — Medication 10 ML: at 15:27

## 2018-01-22 RX ADMIN — BRIMONIDINE TARTRATE 1 DROP: 2 SOLUTION OPHTHALMIC at 14:24

## 2018-01-22 RX ADMIN — AZITHROMYCIN MONOHYDRATE 500 MG: 500 INJECTION, POWDER, LYOPHILIZED, FOR SOLUTION INTRAVENOUS at 18:15

## 2018-01-22 RX ADMIN — GUAIFENESIN AND CODEINE PHOSPHATE 5 ML: 10; 100 LIQUID ORAL at 21:31

## 2018-01-22 RX ADMIN — VITAMIN D, TAB 1000IU (100/BT) 1000 UNITS: 25 TAB at 09:11

## 2018-01-22 RX ADMIN — SODIUM CHLORIDE: 9 INJECTION, SOLUTION INTRAVENOUS at 09:23

## 2018-01-22 RX ADMIN — DORZOLAMIDE HYDROCHLORIDE 1 DROP: 20 SOLUTION/ DROPS OPHTHALMIC at 09:11

## 2018-01-22 RX ADMIN — DORZOLAMIDE HYDROCHLORIDE 1 DROP: 20 SOLUTION/ DROPS OPHTHALMIC at 14:24

## 2018-01-22 RX ADMIN — GADOTERIDOL 14 ML: 279.3 INJECTION, SOLUTION INTRAVENOUS at 15:26

## 2018-01-22 RX ADMIN — MOMETASONE FUROATE AND FORMOTEROL FUMARATE DIHYDRATE 2 PUFF: 200; 5 AEROSOL RESPIRATORY (INHALATION) at 21:42

## 2018-01-22 RX ADMIN — ASPIRIN 81 MG: 81 TABLET, COATED ORAL at 09:11

## 2018-01-22 RX ADMIN — CEFTRIAXONE SODIUM 1 G: 10 INJECTION, POWDER, FOR SOLUTION INTRAVENOUS at 20:52

## 2018-01-22 RX ADMIN — TIMOLOL MALEATE 1 DROP: 5 SOLUTION OPHTHALMIC at 14:24

## 2018-01-22 NOTE — CONSULTS
[address] [facility] [address]                 [address] [place] [address]                                   CONSULTATION    PATIENT NAME: Alysa Orlando                      :        1930  MED REC NO:   [id number]                             ROOM:       0176  ACCOUNT NO:   [de-identified]                           ADMIT DATE: 2018  PROVIDER:     Jeff Valente DATE:  2018    HISTORY OF PRESENT ILLNESS:  This patient is an 27-year-old gentleman whom  I am asked to see in neurology consultation for advice and opinion  regarding dementia. The patient was found on the floor of his home by his  family. It is unknown how long he had been there. He had a cough and  hypotension. He was taken to [facility] and found to be  hypotensive and was directed to the emergency department. He had a cough. He does not remember much of anything else now. He did not bite his  tongue. He did not lose control of his bladder or bowel. He must have  been on the floor for some time. He tells he has rhabdomyolysis. PAST MEDICAL HISTORY:  Significant for cerebral artery occlusion with  cerebral infarction, diabetes, glaucoma, hypertension. Negative for cancer  or seizure. FAMILY HISTORY:  Noncontributory. REVIEW OF SYSTEMS:  He is unable to give review of systems. PERSONAL AND SOCIAL HISTORY:  He is single. He does not use alcohol,  tobacco, or street drugs. MEDICATIONS:  Include aspirin, Alphagan ophthalmic solution, Glucerna,  Cosopt, guaifenesin, latanoprost, lisinopril, tamsulosin, and vitamin D. ALLERGIES:  He has no known drug allergies. PHYSICAL EXAMINATION:  VITAL SIGNS:  His temperature is 97.7 degrees Fahrenheit, pulse 95,  respirations 24, blood pressure 139/71. CHEST:  Clear. HEART:  S1, S2.  NEUROLOGIC:  He is oriented to person only. His speech is fluent and  spontaneous. His skull is normocephalic and atraumatic.  His spine

## 2018-01-22 NOTE — PROGRESS NOTES
FEV1 Predicted Normal Values          Age                                     Height in Feet and Inches       Age                                     Height in Feet and Inches       4' 11\" 5' 1\" 5' 3\" 5' 5\" 5' 7\" 5' 9\" 5' 11\" 6' 1\"  4' 11\" 5' 1\" 5' 3\" 5' 5\" 5' 7\" 5' 9\" 5' 11\" 6' 1\"   42 - 45 2.49 2.66 2.84 3.03 3.22 3.42 3.62 3.83 42 - 45 2.82 3.03 3.26 3.49 3.72 3.96 4.22 4.47   46 - 49 2.40 2.57 2.76 2.94 3.14 3.33 3.54 3.75 46 - 49 2.70 2.92 3.14 3.37 3.61 3.85 4.10 4.36   50 - 53 2.31 2.48 2.66 2.85 3.04 3.24 3.45 3.66 50 - 53 2.58 2.80 3.02 3.25 3.49 3.73 3.98 4.24   54 - 57 2.21 2.38 2.57 2.75 2.95 3.14 3.35 3.56 54 - 57 2.46 2.67 2.89 3.12 3.36 3.60 3.85 4.11   58 - 61 2.10 2.28 2.46 2.65 2.84 3.04 3.24 3.45 58 - 61 2.32 2.54 2.76 2.99 3.23 3.47 3.72 3.98   62 - 65 1.99 2.17 2.35 2.54 2.73 2.93 3.13 3.34 62 - 65 2.19 2.40 2.62 2.85 3.09 3.33 3.58 3.84   66 - 69 1.88 2.05 2.23 2.42 2.61 2.81 3.02 3.23 66 - 69 2.04 2.26 2.48 2.71 2.95 3.19 3.44 3.70   70+ 1.82 1.99 2.17 2.36 2.55 2.75 2.95 3.16 70+ 1.97 2.19 2.41 2.64 2.87 3.12 3.37 3.62             Predicted Peak Expiratory Flow Rate                                       Height (in)  Female       Height (in) Male           Age 54 31 03 89 38 03 22 79 Age            20 659 113 879 112 576 268 952 555  40 97 68 46 68 68 70 59 76   25 337 352 366 381 396 411 426 441 25 447 476 505 533 562 591 619 648 677   30 329 344 359 374 389 404 419 434 30 437 466 494 523 552 580 609 638 667   35 322 337 351 366 381 396 411 426 35 426 455 484 512 541 570 598 627 657   40 314 329 344 359 374 389 404 419 40 416 445 473 502 531 559 588 617 647   45 307 322 336 351 366 381 396 411 45 405 434 463 491 520 549 577 606 636   50 299 314 329 344 359 374 389 404 50 395 424 452 481 510 538 567 596 625   55 292 307 321 336 351 366 381 396 55 384 413 442 470 499 528 556 585 615   60 284 299 314 329 344 359 374 389 60 374 403 431 460 489 517 546 575 605   65 277 292 306 321 336 351 366 381 65 363 392 421 449 478 038 740 189 700   38 640 735 082 044 647 159 794 293 84 700 474 498 470 942 466 784 017 583   75 261 274 289 305 319 334 348 364 75 344 372 400 429 458 487 515 544 573   80 253 266 282 296 312 327 342 356 80 335 362 390 419 448 [phone] 562

## 2018-01-22 NOTE — PROGRESS NOTES
gallops. Abdomen - soft, nontender, nondistended. Neurological - alert,normal speech, no focal findings. Musculoskeletal - no joint tenderness. Extremities - peripheral pulses normal, no pedal edema, negative Paco's. Labs:  Hematology:  Recent Labs      01/20/18   1400  01/22/18   0415   WBC  2.5*  1.4*   HGB  12.3*  9.5*   HCT  37.5*  27.9*   PLT  170  167     Chemistry:  Recent Labs      01/20/18   1400  01/21/18   0439  01/22/18   0415   NA  139  139  136   K  4.2  4.0  4.1   CL  100  106  107   CO2  21  20  19*   GLUCOSE  151*  107*  106*   BUN  29*  29*  26*   CREATININE  1.47*  1.53*  1.31*   CALCIUM  8.9  7.8*  7.5*     Recent Labs      01/20/18   1400  01/20/18   1442  01/20/18 2125  01/21/18   0439  01/22/18   0415  01/22/18   1208   PROT   --    --    --    --    --   4.9*   TSH   --    --   0.35   --    --    --    LDH   --    --    --    --    --   403*   AMMONIA   --   29   --    --    --    --    CKTOTAL  6,491*   --    --   4,189*  3,597*   --    CKMB  21.0*   --    --    --    --    --    EAMON   --    --   POSITIVE*   --    --    --      Echocardiogram: LV size normal. EF 60%. Moderate tricuspid regurgitation. Moderate pulmonary Hypertension. No pericardial effusion. Blood work: anemia, significant neutropenia. Abnormal Immunoglobulin profile. Await input from Hematology service. Talked to daughter - states that patient had a previous Dx of bleeding disorder but unsure of exact Dx. MRI: encephalomalacia, diffuse parenchymal volume loss with mild chronic white matter ischemic changes. Bilateral carotid artery stenosis (close to 55% on right, close to 40% on left). Total CK trending down. Discussed with daughter at bedside/expressed understanding.   Continuing current regimen            Electronically signed by Zion Bose MD on 1/22/2018 at 6:39 PM

## 2018-01-22 NOTE — PROGRESS NOTES
Telephone call from pt's dtr Gilberto Arroyo and completed the MRI screening. Updated on consult for Hematology and she states her father has always had a low WBC, usually around 2- 21/2, but no history of low platelets.

## 2018-01-22 NOTE — PROGRESS NOTES
input(s): TPU in the last 72 hours. Urinalysis:    Recent Labs      01/20/18   1720   NITRU  NEGATIVE   COLORU  DARK YELLOW*   PHUR  5.5   WBCUA  0 TO 2   RBCUA  20 TO 50   MUCUS  NOT REPORTED   TRICHOMONAS  NOT REPORTED   YEAST  NOT REPORTED   BACTERIA  MODERATE*   SPECGRAV  1.025   LEUKOCYTESUR  NEGATIVE   UROBILINOGEN  Normal   BILIRUBINUR  Presumptive positive. Unable to confirm due to unavailability of reagent. *   GLUCOSEU  NEGATIVE   [address]  NEGATIVE   AMORPHOUS  NOT REPORTED         Radiology:  Reviewed as available. Assessment:  1. MAXINE on ckd, most likely secondary to ATN due to rhabdomyolysis, scr improving  2. History of essential hypertension  3. shortness of breath cough CT chest finding of pulmonary fibrosis       Plan:  continue IV fluids  100 ml/per hour for 1 more day  Strict I's and O's    1. Continue to hold Hold lisinopril      Thank you for the consultation.   Sharyn Matnilla        Electronically signed by Sharyn Mantilla MD on 1/22/2018 at 1:06 PM

## 2018-01-22 NOTE — PLAN OF CARE
Problem: Nutrition  Goal: Optimal nutrition therapy  Nutrition Problem: Swallowing difficulty  Intervention: Food and/or Nutrient Delivery: Modify current diet, Start ONS  Nutritional Goals: PO intake to meet >75% of estimated kcal/protein needs, with good tolerance of diet consistency, glycemic control, management of renal labs  Outcome: Ongoing

## 2018-01-22 NOTE — PROGRESS NOTES
Monitoring    Nutrition Evaluation:   · Evaluation: Goals set   · Goals: PO intake to meet >75% of estimated kcal/protein needs, with good tolerance of diet consistency, glycemic control, management of renal labs    · Monitoring: Meal Intake, Supplement Intake, Diet Tolerance, Skin Integrity, Mental Status/Confusion, Weight, Pertinent Labs, Chewing/Swallowing    See Adult Nutrition Doc Flowsheet for more detail.      Electronically signed by Pedro Joseph RDN, ARRON on 1/22/18 at 4:30 PM    Contact Number: [phone]

## 2018-01-23 LAB
ABSOLUTE EOS #: 0.22 K/UL (ref 0–0.4)
ABSOLUTE IMMATURE GRANULOCYTE: ABNORMAL K/UL (ref 0–0.3)
ABSOLUTE LYMPH #: 0.34 K/UL (ref 1–4.8)
ABSOLUTE MONO #: 0.06 K/UL (ref 0.2–0.8)
ANCA MYELOPEROXIDASE: 1187 AU/ML
ANCA PROTEINASE 3: 65 AU/ML
ANION GAP SERPL CALCULATED.3IONS-SCNC: 7 MMOL/L (ref 9–17)
ANTI DNA DOUBLE STRANDED: 340 IU/ML
BASOPHILS # BLD: 0 %
BASOPHILS ABSOLUTE: 0 K/UL (ref 0–0.2)
BUN BLDV-MCNC: 22 MG/DL (ref 8–23)
BUN/CREAT BLD: 19 (ref 9–20)
CALCIUM SERPL-MCNC: 7.3 MG/DL (ref 8.6–10.4)
CHLORIDE BLD-SCNC: 110 MMOL/L (ref 98–107)
CO2: 21 MMOL/L (ref 20–31)
CREAT SERPL-MCNC: 1.17 MG/DL (ref 0.7–1.2)
DIFFERENTIAL TYPE: ABNORMAL
EOSINOPHILS RELATIVE PERCENT: 14 % (ref 1–4)
GFR AFRICAN AMERICAN: >60 ML/MIN
GFR NON-AFRICAN AMERICAN: 59 ML/MIN
GFR SERPL CREATININE-BSD FRML MDRD: ABNORMAL ML/MIN/{1.73_M2}
GFR SERPL CREATININE-BSD FRML MDRD: ABNORMAL ML/MIN/{1.73_M2}
GLUCOSE BLD-MCNC: 103 MG/DL (ref 70–99)
HCT VFR BLD CALC: 29.3 % (ref 41–53)
HEMOGLOBIN: 9.6 G/DL (ref 13.5–17.5)
IMMATURE GRANULOCYTES: ABNORMAL %
LYMPHOCYTES # BLD: 21 % (ref 24–44)
MCH RBC QN AUTO: 31.6 PG (ref 26–34)
MCHC RBC AUTO-ENTMCNC: 32.9 G/DL (ref 31–37)
MCV RBC AUTO: 96.1 FL (ref 80–100)
MONOCYTES # BLD: 4 % (ref 1–7)
MORPHOLOGY: ABNORMAL
MYOGLOBIN: 214 NG/ML (ref 28–72)
NRBC AUTOMATED: ABNORMAL PER 100 WBC
PATHOLOGIST REVIEW: NORMAL
PDW BLD-RTO: 14 % (ref 11.5–14.5)
PLATELET # BLD: 197 K/UL (ref 130–400)
PLATELET ESTIMATE: ABNORMAL
PMV BLD AUTO: 7.4 FL (ref 6–12)
POTASSIUM SERPL-SCNC: 4.5 MMOL/L (ref 3.7–5.3)
RBC # BLD: 3.05 M/UL (ref 4.5–5.9)
RBC # BLD: ABNORMAL 10*6/UL
SEG NEUTROPHILS: 61 % (ref 36–66)
SEGMENTED NEUTROPHILS ABSOLUTE COUNT: 0.98 K/UL (ref 1.8–7.7)
SODIUM BLD-SCNC: 138 MMOL/L (ref 135–144)
SURGICAL PATHOLOGY REPORT: NORMAL
TOTAL CK: 1335 U/L (ref 39–308)
WBC # BLD: 1.6 K/UL (ref 3.5–11)
WBC # BLD: ABNORMAL 10*3/UL

## 2018-01-23 PROCEDURE — 2580000003 HC RX 258: Performed by: NURSE PRACTITIONER

## 2018-01-23 PROCEDURE — 94640 AIRWAY INHALATION TREATMENT: CPT

## 2018-01-23 PROCEDURE — G8979 MOBILITY GOAL STATUS: HCPCS

## 2018-01-23 PROCEDURE — 2500000003 HC RX 250 WO HCPCS: Performed by: NURSE PRACTITIONER

## 2018-01-23 PROCEDURE — G8978 MOBILITY CURRENT STATUS: HCPCS

## 2018-01-23 PROCEDURE — 97535 SELF CARE MNGMENT TRAINING: CPT

## 2018-01-23 PROCEDURE — 99232 SBSQ HOSP IP/OBS MODERATE 35: CPT | Performed by: INTERNAL MEDICINE

## 2018-01-23 PROCEDURE — G8988 SELF CARE GOAL STATUS: HCPCS

## 2018-01-23 PROCEDURE — 2060000000 HC ICU INTERMEDIATE R&B

## 2018-01-23 PROCEDURE — 85025 COMPLETE CBC W/AUTO DIFF WBC: CPT

## 2018-01-23 PROCEDURE — 6370000000 HC RX 637 (ALT 250 FOR IP): Performed by: NURSE PRACTITIONER

## 2018-01-23 PROCEDURE — 6370000000 HC RX 637 (ALT 250 FOR IP): Performed by: INTERNAL MEDICINE

## 2018-01-23 PROCEDURE — 97530 THERAPEUTIC ACTIVITIES: CPT

## 2018-01-23 PROCEDURE — 6360000002 HC RX W HCPCS: Performed by: NURSE PRACTITIONER

## 2018-01-23 PROCEDURE — 97166 OT EVAL MOD COMPLEX 45 MIN: CPT

## 2018-01-23 PROCEDURE — G8987 SELF CARE CURRENT STATUS: HCPCS

## 2018-01-23 PROCEDURE — 36415 COLL VENOUS BLD VENIPUNCTURE: CPT

## 2018-01-23 PROCEDURE — 80048 BASIC METABOLIC PNL TOTAL CA: CPT

## 2018-01-23 PROCEDURE — 83874 ASSAY OF MYOGLOBIN: CPT

## 2018-01-23 PROCEDURE — 97110 THERAPEUTIC EXERCISES: CPT

## 2018-01-23 PROCEDURE — 82550 ASSAY OF CK (CPK): CPT

## 2018-01-23 PROCEDURE — 2700000000 HC OXYGEN THERAPY PER DAY

## 2018-01-23 PROCEDURE — 2580000003 HC RX 258: Performed by: INTERNAL MEDICINE

## 2018-01-23 PROCEDURE — 97162 PT EVAL MOD COMPLEX 30 MIN: CPT

## 2018-01-23 RX ORDER — BENZONATATE 100 MG/1
100 CAPSULE ORAL 3 TIMES DAILY PRN
Status: DISCONTINUED | OUTPATIENT
Start: 2018-01-23 | End: 2018-01-26 | Stop reason: HOSPADM

## 2018-01-23 RX ADMIN — TIMOLOL MALEATE 1 DROP: 5 SOLUTION OPHTHALMIC at 20:40

## 2018-01-23 RX ADMIN — TAMSULOSIN HYDROCHLORIDE 0.4 MG: 0.4 CAPSULE ORAL at 11:01

## 2018-01-23 RX ADMIN — BRIMONIDINE TARTRATE 1 DROP: 2 SOLUTION OPHTHALMIC at 20:40

## 2018-01-23 RX ADMIN — BRIMONIDINE TARTRATE 1 DROP: 2 SOLUTION OPHTHALMIC at 00:00

## 2018-01-23 RX ADMIN — BRIMONIDINE TARTRATE 1 DROP: 2 SOLUTION OPHTHALMIC at 06:24

## 2018-01-23 RX ADMIN — DORZOLAMIDE HYDROCHLORIDE 1 DROP: 20 SOLUTION/ DROPS OPHTHALMIC at 14:52

## 2018-01-23 RX ADMIN — LATANOPROST 1 DROP: 50 SOLUTION OPHTHALMIC at 20:40

## 2018-01-23 RX ADMIN — BENZONATATE 100 MG: 100 CAPSULE ORAL at 20:39

## 2018-01-23 RX ADMIN — TIMOLOL MALEATE 1 DROP: 5 SOLUTION OPHTHALMIC at 14:52

## 2018-01-23 RX ADMIN — DORZOLAMIDE HYDROCHLORIDE 1 DROP: 20 SOLUTION/ DROPS OPHTHALMIC at 20:39

## 2018-01-23 RX ADMIN — DORZOLAMIDE HYDROCHLORIDE 1 DROP: 20 SOLUTION/ DROPS OPHTHALMIC at 10:59

## 2018-01-23 RX ADMIN — AZITHROMYCIN MONOHYDRATE 500 MG: 500 INJECTION, POWDER, LYOPHILIZED, FOR SOLUTION INTRAVENOUS at 17:56

## 2018-01-23 RX ADMIN — MOMETASONE FUROATE AND FORMOTEROL FUMARATE DIHYDRATE 2 PUFF: 200; 5 AEROSOL RESPIRATORY (INHALATION) at 10:04

## 2018-01-23 RX ADMIN — BRIMONIDINE TARTRATE 1 DROP: 2 SOLUTION OPHTHALMIC at 14:52

## 2018-01-23 RX ADMIN — ASPIRIN 81 MG: 81 TABLET, COATED ORAL at 11:01

## 2018-01-23 RX ADMIN — SODIUM CHLORIDE: 9 INJECTION, SOLUTION INTRAVENOUS at 14:52

## 2018-01-23 RX ADMIN — MOMETASONE FUROATE AND FORMOTEROL FUMARATE DIHYDRATE 2 PUFF: 200; 5 AEROSOL RESPIRATORY (INHALATION) at 21:18

## 2018-01-23 RX ADMIN — TIMOLOL MALEATE 1 DROP: 5 SOLUTION OPHTHALMIC at 11:07

## 2018-01-23 RX ADMIN — VITAMIN D, TAB 1000IU (100/BT) 1000 UNITS: 25 TAB at 11:01

## 2018-01-23 RX ADMIN — CEFTRIAXONE SODIUM 1 G: 10 INJECTION, POWDER, FOR SOLUTION INTRAVENOUS at 20:40

## 2018-01-23 RX ADMIN — SODIUM CHLORIDE: 9 INJECTION, SOLUTION INTRAVENOUS at 04:59

## 2018-01-23 NOTE — CONSULTS
Today's Date: 1/22/2018  Patient Name: Any Andre  Date of admission: 1/20/2018  1:25 PM  Patient's age: 80 y.o., 2/9/1930  Admission Dx: Rhabdomyolysis [M62.82]    Reason for Consult: Leukopenia  Requesting Physician: Romero Campbell MD    CHIEF COMPLAINT:  Leukopenia    History Obtained From:  Pt and chart    HISTORY OF PRESENT ILLNESS:      This is a 79 YO male was admitted with cough, fall and rhabdomyolysis. Pt was found on floor by family member. No fever chills. He has history of pulmonary fibrosis. On admission he was noted to have MAXINE. With total CK more than 6000. Now trending downward. His WBC noted to be 2.5 and now dropped to 1.4. His HB 9.5 and has normal plts. He denied any unintentional wt loss. Past Medical History:   has a past medical history of Cerebral artery occlusion with cerebral infarction ([facility] [address] Diabetes mellitus ([facility] [address] Glaucoma; and Hypertension. Past Surgical History:   has a past surgical history that includes Eye surgery. Medications:    Prior to Admission medications    Medication Sig Start Date End Date Taking?  Authorizing Provider   lisinopril (PRINIVIL;ZESTRIL) 20 MG tablet Take 20 mg by mouth daily   Yes Historical Provider, MD   aspirin 81 MG EC tablet Take 81 mg by mouth daily   Yes Historical Provider, MD   guaiFENesin 200 MG tablet Take 400 mg by mouth 3 times daily as needed for Congestion   Yes Historical Provider, MD   brimonidine (ALPHAGAN) 0.2 % ophthalmic solution Place 1 drop into the left eye 3 times daily   Yes Historical Provider, MD   dorzolamide-timolol (COSOPT) 22.3-6.8 MG/ML ophthalmic solution Place 1 drop into both eyes 3 times daily   Yes Historical Provider, MD   latanoprost (XALATAN) 0.005 % ophthalmic solution Place 1 drop into both eyes nightly   Yes Historical Provider, MD   tamsulosin (FLOMAX) 0.4 MG capsule Take 0.4 mg by mouth daily   Yes Historical Provider, MD   camphor-menthol (SARNA) 0.5-0.5 % lotion Apply topically 3 times Romero Campbell MD   1 drop at 18       Allergies:  Review of patient's allergies indicates no known allergies. Social History:   reports that he has quit smoking. He has never used smokeless tobacco. He reports that he does not drink alcohol or use drugs. Family History: family history is not on file. REVIEW OF SYSTEMS:    Constitutional: ++fatigue, No fever or chills. No night sweats, no weight loss   Eyes: No eye discharge, double vision, or eye pain   HEENT: negative for sore mouth, sore throat, hoarseness and voice change   Respiratory: ++ cough , sputum, dyspnea, wheezing, hemoptysis, chest pain   Cardiovascular: negative for chest pain, dyspnea, palpitations, orthopnea, PND   Gastrointestinal: negative for nausea, vomiting, diarrhea, constipation, abdominal pain, Dysphagia, hematemesis and hematochezia   Genitourinary: negative for frequency, dysuria, nocturia, urinary incontinence, and hematuria   Integument: negative for rash, skin lesions, bruises.    Hematologic/Lymphatic: negative for easy bruising, bleeding, lymphadenopathy, or petechiae   Endocrine: negative for heat or cold intolerance,weight changes, change in bowel habits and hair loss   Musculoskeletal: negative for myalgias, arthralgias, pain, joint swelling,and bone pain   Neurological: negative for headaches, dizziness, seizures, weakness, numbness    PHYSICAL EXAM:      BP (!) 125/57   Pulse 76   Temp 98.1 °F (36.7 °C) (Oral)   Resp 20   Wt 147 lb (66.7 kg)   SpO2 95%    Temp (24hrs), Av.8 °F (37.1 °C), Min:97.9 °F (36.6 °C), Max:101.3 °F (38.5 °C)  General appearance - elderly, well appearing, no in pain or distress   Mental status - alert and cooperative   Eyes - pupils equal and reactive, extraocular eye movements intact   Ears - bilateral TM's and external ear canals normal   Mouth - mucous membranes moist, pharynx normal without lesions   Neck - supple, no significant adenopathy   Lymphatics - no palpable

## 2018-01-23 NOTE — PROGRESS NOTES
allergies. Social History:   reports that he has quit smoking. He has never used smokeless tobacco. He reports that he does not drink alcohol or use drugs. Family History: family history is not on file. REVIEW OF SYSTEMS:    Constitutional: ++fatigue, No fever or chills. No night sweats, no weight loss   Eyes: No eye discharge, double vision, or eye pain   HEENT: negative for sore mouth, sore throat, hoarseness and voice change   Respiratory: ++ cough , sputum, dyspnea, wheezing, hemoptysis, chest pain   Cardiovascular: negative for chest pain, dyspnea, palpitations, orthopnea, PND   Gastrointestinal: negative for nausea, vomiting, diarrhea, constipation, abdominal pain, Dysphagia, hematemesis and hematochezia   Genitourinary: negative for frequency, dysuria, nocturia, urinary incontinence, and hematuria   Integument: negative for rash, skin lesions, bruises.    Hematologic/Lymphatic: negative for easy bruising, bleeding, lymphadenopathy, or petechiae   Endocrine: negative for heat or cold intolerance,weight changes, change in bowel habits and hair loss   Musculoskeletal: negative for myalgias, arthralgias, pain, joint swelling,and bone pain   Neurological: negative for headaches, dizziness, seizures, weakness, numbness    PHYSICAL EXAM:      BP (!) 112/46   Pulse 75   Temp 97.9 °F (36.6 °C) (Oral)   Resp 16   Wt 147 lb (66.7 kg)   SpO2 100%    Temp (24hrs), Av °F (36.7 °C), Min:97.9 °F (36.6 °C), Max:98.2 °F (36.8 °C)  General appearance - elderly, well appearing, no in pain or distress   Mental status - alert and cooperative   Eyes - pupils equal and reactive, extraocular eye movements intact   Ears - bilateral TM's and external ear canals normal   Mouth - mucous membranes moist, pharynx normal without lesions   Neck - supple, no significant adenopathy   Lymphatics - no palpable lymphadenopathy, no hepatosplenomegaly   Chest - clear to auscultation, no wheezes, rales or rhonchi, symmetric air entry   Heart - normal rate, regular rhythm, normal S1, S2, no murmurs  Abdomen - soft, nontender, nondistended, no masses or organomegaly   Neurological - alert, oriented, normal speech, no focal findings or movement disorder noted   Musculoskeletal - no joint tenderness, deformity or swelling   Extremities - peripheral pulses normal, no pedal edema, no clubbing or cyanosis   Skin - normal coloration and turgor, no rashes, no suspicious skin lesions noted ,    DATA:    Labs:   CBC:   Recent Labs      01/20/18   1400  01/22/18   0415   WBC  2.5*  1.4*   HGB  12.3*  9.5*   HCT  37.5*  27.9*   PLT  170  167     BMP:   Recent Labs      01/22/18   0415  01/23/18   0450   NA  136  138   K  4.1  4.5   CO2  19*  21   BUN  26*  22   CREATININE  1.31*  1.17   LABGLOM  52*  59*   GLUCOSE  106*  103*     PT/INR: No results for input(s): PROTIME, INR in the last 72 hours. Ref. Range 1/22/2018 12:08   Ferritin Latest Ref Range: 30 - 400 ug/L 788 (H)   Iron Latest Ref Range: 59 - 158 ug/dL 27 (L)   Iron Saturation Latest Ref Range: 20 - 55 % 28   UIBC Latest Ref Range: 112 - 347 ug/dL 70 (L)   TIBC Latest Ref Range: 250 - 450 ug/dL 97 (L)   Folate Latest Ref Range: >4.8 ng/mL 14.2   Vitamin B-12 Latest Ref Range: 232 - 1245 pg/mL 624     IMAGING DATA:  Reviewed    Primary Problem  Rhabdomyolysis    Active Hospital Problems    Diagnosis Date Noted    Encephalomalacia on imaging study [G93.89] 01/22/2018    Neutropenia ([facility] [address] [D70.9]     Non-traumatic rhabdomyolysis [M62.82]     Rhabdomyolysis [M62.82] 01/20/2018    Pulmonary fibrosis ([facility] [address] [J84.10] 01/20/2018    Acute kidney injury ([facility] [address] [N17.9] 01/20/2018    Encephalopathy [G93.40] 01/20/2018     IMPRESSION:   1. Rhabdomyolysis  2. MAXINE  3. Leukopenia: He seems to have chronically low WBC at since 2014. WBC noted at that time 3.0. Possibility of low grade underlying BM disorder can not be ruled out  4. Anemia  5. Encephalopathy  6.   Fall    RECOMMENDATIONS:  1. I

## 2018-01-23 NOTE — PROGRESS NOTES
Department of Internal Medicine  Nephrology Zaynab Berry MD    Progress Note        SUBJECTIVE:  We are following this patient for the management of acute kidney injury and rhabdomyolysis. Luz Yanes is a 80 y.o. male with past medical history of Systemic hypertension an type 2 DM, who presented with complaints of cough and shortness of breath after being found on the floor by his daughter. At presentation, laboratory studies were remarkable for serum creatinine 1.4 mg/dL and elevated CPK 6491. Today, he is nonoliguric and comfortable at rest.    Physical Exam:    VITALS:  BP (!) 125/57   Pulse 76   Temp 98.1 °F (36.7 °C) (Oral)   Resp 20   Wt 147 lb (66.7 kg)   SpO2 95%   24HR INTAKE/OUTPUT:    Intake/Output Summary (Last 24 hours) at 01/23/18 0803  Last data filed at 01/23/18 0502   Gross per 24 hour   Intake             3717 ml   Output              910 ml   Net             2807 ml       Constitutional:  Awake and responsive; not in obvious distress. Cardiovascular/Edema:  S1-S2 no pericardial rub. Respiratory:  Findings bilateral basal rales. Gastrointestinal:  Soft with normal bowel sounds.       CBC:   Recent Labs      01/20/18   1400  01/22/18   0415   WBC  2.5*  1.4*   HGB  12.3*  9.5*   PLT  170  167     BMP:    Recent Labs      01/21/18   0439  01/22/18   0415  01/23/18   0450   NA  139  136  138   K  4.0  4.1  4.5   CL  106  107  110*   CO2  20  19*  21   BUN  29*  26*  22   CREATININE  1.53*  1.31*  1.17   GLUCOSE  107*  106*  103*       Lab Results   Component Value Date    NITRU NEGATIVE 01/20/2018    COLORU DARK YELLOW 01/20/2018    PHUR 5.5 01/20/2018    WBCUA 0 TO 2 01/20/2018    RBCUA 20 TO 50 01/20/2018    MUCUS NOT REPORTED 01/20/2018    TRICHOMONAS NOT REPORTED 01/20/2018    YEAST NOT REPORTED 01/20/2018    BACTERIA MODERATE 01/20/2018    SPECGRAV 1.025 01/20/2018    LEUKOCYTESUR NEGATIVE 01/20/2018    UROBILINOGEN Normal 01/20/2018    BILIRUBINUR  01/20/2018

## 2018-01-23 NOTE — PROGRESS NOTES
Justa Saini is a 80 y.o. male patient.     Current Facility-Administered Medications   Medication Dose Route Frequency Provider Last Rate Last Dose    acetaminophen (TYLENOL) tablet 650 mg  650 mg Oral Q4H PRN Cheyanne Miranda MD        guaiFENesin-codeine (GUAIFENESIN AC) 100-10 MG/5ML liquid 5 mL  5 mL Oral Q4H PRN Sally Square, CNP   5 mL at 01/22/18 2131    mometasone-formoterol (DULERA) 200-5 MCG/ACT inhaler 2 puff  2 puff Inhalation BID Sally Square, CNP   2 puff at 01/23/18 1004    azithromycin (ZITHROMAX) 500 mg in D5W 250ml addavial  500 mg Intravenous Q24H Sally Square, CNP   Stopped at 01/22/18 1915    cefTRIAXone (ROCEPHIN) 1 g in sterile water 10 mL IV syringe  1 g Intravenous Q24H Sally Square, CNP   1 g at 01/22/18 2052    albuterol (PROVENTIL) nebulizer solution 2.5 mg  2.5 mg Nebulization Q6H PRN Dusty Gill MD        0.9 % sodium chloride infusion   Intravenous Continuous Alireza Martinez  mL/hr at 01/23/18 0459      brimonidine (ALPHAGAN) 0.2 % ophthalmic solution 1 drop  1 drop Left Eye TID Cheyanne Miranda MD   1 drop at 01/23/18 6361    aspirin EC tablet 81 mg  81 mg Oral Daily Liz Sheriff MD   81 mg at 01/23/18 1101    latanoprost (XALATAN) 0.005 % ophthalmic solution 1 drop  1 drop Both Eyes Nightly Cheyanne Miranda MD   1 drop at 01/22/18 2053    tamsulosin (FLOMAX) capsule 0.4 mg  0.4 mg Oral Daily Liz Sheriff MD   0.4 mg at 01/23/18 1101    vitamin D (CHOLECALCIFEROL) tablet 1,000 Units  1,000 Units Oral Daily Cheyanne Miranda MD   1,000 Units at 01/23/18 1101    dorzolamide (TRUSOPT) 2 % ophthalmic solution 1 drop  1 drop Both Eyes TID Cheyanne Miranda MD   1 drop at 01/23/18 1059    And    timolol (TIMOPTIC) 0.5 % ophthalmic solution 1 drop  1 drop Both Eyes TID Cheyanne Miranda MD   1 drop at 01/23/18 1107     No Known Allergies  Principal Problem:    Rhabdomyolysis  Active Problems:    Pulmonary fibrosis ([address]    Acute

## 2018-01-23 NOTE — PROGRESS NOTES
procurement, Self-Care / ADL, Patient/Caregiver Education & Training    G-Code  OT G-codes  Functional Assessment Tool Used: AM-PAC 6 Clicks  Score: 19  Functional Limitation: Self care  Self Care Current Status (): At least 40 percent but less than 60 percent impaired, limited or restricted  Self Care Goal Status (): At least 1 percent but less than 20 percent impaired, limited or restricted  OutComes Score                                           AM-PAC Score             Goals  Short term goals  Time Frame for Short term goals: 6-8 Sessions  Short term goal 1: Pt. will be able to perform all aspects of basic self care at Mod-I level including setup using RW. Short term goal 2: Pt. will be able to demonstrate functional transfers (toilet, bed, shower, chair) at Mod-I level using appropriate DME and RW. Short term goal 3: Pt. will tolerate standing for 5- 6 min with RW for UE support for increased participation in toileting and sinkside grooming. Short term goal 4: Pt. will demo safe and Mod-I standing balance using a RW during functional reaching and mobility while performing clothing/item retrieval for ADL setup. Short term goal 5: Pt. will verb good understanding of fall prevention techs and EC/WS techs to use following d/c. Patient Goals   Patient goals :  \"To go home\"       Therapy Time   Individual Concurrent Group Co-treatment   Time In 6973         Time Out 1315         Minutes [facility], OT

## 2018-01-24 PROBLEM — D47.2 MONOCLONAL GAMMOPATHY: Status: ACTIVE | Noted: 2018-01-24

## 2018-01-24 LAB
ABSOLUTE EOS #: 0.11 K/UL (ref 0–0.4)
ABSOLUTE IMMATURE GRANULOCYTE: ABNORMAL K/UL (ref 0–0.3)
ABSOLUTE LYMPH #: 0.61 K/UL (ref 1–4.8)
ABSOLUTE MONO #: 0.2 K/UL (ref 0.2–0.8)
ALBUMIN (CALCULATED): 2.8 G/DL (ref 3.2–5.2)
ALBUMIN PERCENT: 57 % (ref 45–65)
ALPHA 1 PERCENT: 4 % (ref 3–6)
ALPHA 2 PERCENT: 14 % (ref 6–13)
ALPHA-1 ANTITRYPSIN PHENOTYPE: NORMAL
ALPHA-1 ANTITRYPSIN: 152 MG/DL (ref 90–200)
ALPHA-1-GLOBULIN: 0.2 G/DL (ref 0.1–0.4)
ALPHA-2-GLOBULIN: 0.7 G/DL (ref 0.5–0.9)
ANION GAP SERPL CALCULATED.3IONS-SCNC: 10 MMOL/L (ref 9–17)
BASOPHILS # BLD: 1 %
BASOPHILS ABSOLUTE: 0.02 K/UL (ref 0–0.2)
BETA GLOBULIN: 0.5 G/DL (ref 0.5–1.1)
BETA PERCENT: 9 % (ref 11–19)
BUN BLDV-MCNC: 15 MG/DL (ref 8–23)
BUN/CREAT BLD: 16 (ref 9–20)
CALCIUM SERPL-MCNC: 7.5 MG/DL (ref 8.6–10.4)
CHLORIDE BLD-SCNC: 110 MMOL/L (ref 98–107)
CO2: 20 MMOL/L (ref 20–31)
COMPLEMENT C3: 68 MG/DL (ref 90–180)
COMPLEMENT C4: 17 MG/DL (ref 10–40)
CREAT SERPL-MCNC: 0.92 MG/DL (ref 0.7–1.2)
DIFFERENTIAL TYPE: ABNORMAL
EOSINOPHILS RELATIVE PERCENT: 6 % (ref 1–4)
GAMMA GLOBULIN %: 16 % (ref 9–20)
GAMMA GLOBULIN: 0.8 G/DL (ref 0.5–1.5)
GFR AFRICAN AMERICAN: >60 ML/MIN
GFR NON-AFRICAN AMERICAN: >60 ML/MIN
GFR SERPL CREATININE-BSD FRML MDRD: ABNORMAL ML/MIN/{1.73_M2}
GFR SERPL CREATININE-BSD FRML MDRD: ABNORMAL ML/MIN/{1.73_M2}
GLUCOSE BLD-MCNC: 100 MG/DL (ref 70–99)
HCT VFR BLD CALC: 31.6 % (ref 41–53)
HEMOGLOBIN: 10.4 G/DL (ref 13.5–17.5)
IMMATURE GRANULOCYTES: ABNORMAL %
LYMPHOCYTES # BLD: 34 % (ref 24–44)
MCH RBC QN AUTO: 31.7 PG (ref 26–34)
MCHC RBC AUTO-ENTMCNC: 33 G/DL (ref 31–37)
MCV RBC AUTO: 95.9 FL (ref 80–100)
MONOCYTES # BLD: 11 % (ref 1–7)
MORPHOLOGY: ABNORMAL
MYOGLOBIN: 146 NG/ML (ref 28–72)
NRBC AUTOMATED: ABNORMAL PER 100 WBC
PARVOVIRUS B19 IGG ANTIBODY: 0.79 IV
PARVOVIRUS B19 IGM ANTIBODY: 0.17 IV
PATHOLOGIST: ABNORMAL
PATHOLOGIST: NORMAL
PDW BLD-RTO: 14.3 % (ref 11.5–14.5)
PLATELET # BLD: 239 K/UL (ref 130–400)
PLATELET ESTIMATE: ABNORMAL
PMV BLD AUTO: 7.7 FL (ref 6–12)
POTASSIUM SERPL-SCNC: 3.9 MMOL/L (ref 3.7–5.3)
PROTEIN ELECTROPHORESIS, SERUM: ABNORMAL
RBC # BLD: 3.3 M/UL (ref 4.5–5.9)
RBC # BLD: ABNORMAL 10*6/UL
SEG NEUTROPHILS: 48 % (ref 36–66)
SEGMENTED NEUTROPHILS ABSOLUTE COUNT: 0.86 K/UL (ref 1.8–7.7)
SERUM IFX INTERP: NORMAL
SODIUM BLD-SCNC: 140 MMOL/L (ref 135–144)
TOTAL CK: 599 U/L (ref 39–308)
TOTAL PROT. SUM,%: 100 % (ref 98–102)
TOTAL PROT. SUM: 5 G/DL (ref 6.3–8.2)
TOTAL PROTEIN: 4.9 G/DL (ref 6.4–8.3)
WBC # BLD: 1.8 K/UL (ref 3.5–11)
WBC # BLD: ABNORMAL 10*3/UL

## 2018-01-24 PROCEDURE — 6370000000 HC RX 637 (ALT 250 FOR IP): Performed by: NURSE PRACTITIONER

## 2018-01-24 PROCEDURE — 6370000000 HC RX 637 (ALT 250 FOR IP): Performed by: INTERNAL MEDICINE

## 2018-01-24 PROCEDURE — 94760 N-INVAS EAR/PLS OXIMETRY 1: CPT

## 2018-01-24 PROCEDURE — 80048 BASIC METABOLIC PNL TOTAL CA: CPT

## 2018-01-24 PROCEDURE — 85025 COMPLETE CBC W/AUTO DIFF WBC: CPT

## 2018-01-24 PROCEDURE — 2060000000 HC ICU INTERMEDIATE R&B

## 2018-01-24 PROCEDURE — 2700000000 HC OXYGEN THERAPY PER DAY

## 2018-01-24 PROCEDURE — 86160 COMPLEMENT ANTIGEN: CPT

## 2018-01-24 PROCEDURE — 2500000003 HC RX 250 WO HCPCS: Performed by: NURSE PRACTITIONER

## 2018-01-24 PROCEDURE — 82550 ASSAY OF CK (CPK): CPT

## 2018-01-24 PROCEDURE — 2580000003 HC RX 258: Performed by: INTERNAL MEDICINE

## 2018-01-24 PROCEDURE — 99232 SBSQ HOSP IP/OBS MODERATE 35: CPT | Performed by: INTERNAL MEDICINE

## 2018-01-24 PROCEDURE — 6360000002 HC RX W HCPCS: Performed by: NURSE PRACTITIONER

## 2018-01-24 PROCEDURE — 83874 ASSAY OF MYOGLOBIN: CPT

## 2018-01-24 PROCEDURE — 97535 SELF CARE MNGMENT TRAINING: CPT

## 2018-01-24 PROCEDURE — 2580000003 HC RX 258: Performed by: NURSE PRACTITIONER

## 2018-01-24 PROCEDURE — 86162 COMPLEMENT TOTAL (CH50): CPT

## 2018-01-24 PROCEDURE — 36415 COLL VENOUS BLD VENIPUNCTURE: CPT

## 2018-01-24 PROCEDURE — 94640 AIRWAY INHALATION TREATMENT: CPT

## 2018-01-24 PROCEDURE — 97530 THERAPEUTIC ACTIVITIES: CPT

## 2018-01-24 RX ADMIN — TIMOLOL MALEATE 1 DROP: 5 SOLUTION OPHTHALMIC at 09:15

## 2018-01-24 RX ADMIN — GUAIFENESIN AND CODEINE PHOSPHATE 5 ML: 10; 100 LIQUID ORAL at 04:21

## 2018-01-24 RX ADMIN — MOMETASONE FUROATE AND FORMOTEROL FUMARATE DIHYDRATE 2 PUFF: 200; 5 AEROSOL RESPIRATORY (INHALATION) at 09:22

## 2018-01-24 RX ADMIN — GUAIFENESIN AND CODEINE PHOSPHATE 5 ML: 10; 100 LIQUID ORAL at 19:07

## 2018-01-24 RX ADMIN — VITAMIN D, TAB 1000IU (100/BT) 1000 UNITS: 25 TAB at 09:13

## 2018-01-24 RX ADMIN — DORZOLAMIDE HYDROCHLORIDE 1 DROP: 20 SOLUTION/ DROPS OPHTHALMIC at 09:20

## 2018-01-24 RX ADMIN — BENZONATATE 100 MG: 100 CAPSULE ORAL at 13:42

## 2018-01-24 RX ADMIN — TIMOLOL MALEATE 1 DROP: 5 SOLUTION OPHTHALMIC at 20:35

## 2018-01-24 RX ADMIN — MOMETASONE FUROATE AND FORMOTEROL FUMARATE DIHYDRATE 2 PUFF: 200; 5 AEROSOL RESPIRATORY (INHALATION) at 21:21

## 2018-01-24 RX ADMIN — TAMSULOSIN HYDROCHLORIDE 0.4 MG: 0.4 CAPSULE ORAL at 09:13

## 2018-01-24 RX ADMIN — SODIUM CHLORIDE: 9 INJECTION, SOLUTION INTRAVENOUS at 09:19

## 2018-01-24 RX ADMIN — DORZOLAMIDE HYDROCHLORIDE 1 DROP: 20 SOLUTION/ DROPS OPHTHALMIC at 16:27

## 2018-01-24 RX ADMIN — CEFTRIAXONE SODIUM 1 G: 10 INJECTION, POWDER, FOR SOLUTION INTRAVENOUS at 20:35

## 2018-01-24 RX ADMIN — LATANOPROST 1 DROP: 50 SOLUTION OPHTHALMIC at 20:35

## 2018-01-24 RX ADMIN — DORZOLAMIDE HYDROCHLORIDE 1 DROP: 20 SOLUTION/ DROPS OPHTHALMIC at 20:35

## 2018-01-24 RX ADMIN — AZITHROMYCIN MONOHYDRATE 500 MG: 500 INJECTION, POWDER, LYOPHILIZED, FOR SOLUTION INTRAVENOUS at 17:47

## 2018-01-24 RX ADMIN — TIMOLOL MALEATE 1 DROP: 5 SOLUTION OPHTHALMIC at 16:30

## 2018-01-24 RX ADMIN — BRIMONIDINE TARTRATE 1 DROP: 2 SOLUTION OPHTHALMIC at 09:14

## 2018-01-24 RX ADMIN — BRIMONIDINE TARTRATE 1 DROP: 2 SOLUTION OPHTHALMIC at 16:30

## 2018-01-24 RX ADMIN — BRIMONIDINE TARTRATE 1 DROP: 2 SOLUTION OPHTHALMIC at 20:35

## 2018-01-24 RX ADMIN — ASPIRIN 81 MG: 81 TABLET, COATED ORAL at 09:13

## 2018-01-24 ASSESSMENT — PAIN SCALES - GENERAL: PAINLEVEL_OUTOF10: 0

## 2018-01-24 NOTE — PROGRESS NOTES
Progress Note    PCP: No primary care provider on file. 1/24/2018  12:58 PM    Admitting Diagnosis:  Rhabdomyolysis    Problem List:  Active Hospital Problems    Diagnosis Date Noted    Encephalomalacia on imaging study [G93.89] 01/22/2018    Neutropenia ([facility] [address] [D70.9]     Non-traumatic rhabdomyolysis [M62.82]     Rhabdomyolysis [M62.82] 01/20/2018    Pulmonary fibrosis ([facility] [address] [J84.10] 01/20/2018    Acute kidney injury ([facility] [address] [N17.9] 01/20/2018    Encephalopathy [G93.40] 01/20/2018       Chief Complaint: Rhabdomyolysis    Subjective: no new/interval complaints. Cough less. Allergies:  No Known Allergies    Current Medications:    benzonatate (TESSALON) capsule 100 mg TID PRN   acetaminophen (TYLENOL) tablet 650 mg Q4H PRN   guaiFENesin-codeine (GUAIFENESIN AC) 100-10 MG/5ML liquid 5 mL Q4H PRN   mometasone-formoterol (DULERA) 200-5 MCG/ACT inhaler 2 puff BID   azithromycin (ZITHROMAX) 500 mg in D5W 250ml addavial Q24H   cefTRIAXone (ROCEPHIN) 1 g in sterile water 10 mL IV syringe Q24H   albuterol (PROVENTIL) nebulizer solution 2.5 mg Q6H PRN   0.9 % sodium chloride infusion Continuous   brimonidine (ALPHAGAN) 0.2 % ophthalmic solution 1 drop TID   aspirin EC tablet 81 mg Daily   latanoprost (XALATAN) 0.005 % ophthalmic solution 1 drop Nightly   tamsulosin (FLOMAX) capsule 0.4 mg Daily   vitamin D (CHOLECALCIFEROL) tablet 1,000 Units Daily   dorzolamide (TRUSOPT) 2 % ophthalmic solution 1 drop TID   And    timolol (TIMOPTIC) 0.5 % ophthalmic solution 1 drop TID       Physical Examination:  BP (!) 112/50   Pulse 72   Temp 97.2 °F (36.2 °C) (Oral)   Resp 16   Wt 147 lb (66.7 kg)   SpO2 98%   General appearance - in no distress  Mental status - alert, stable mental status. Eyes - pupils equal and reactive, extraocular eye movements intact. Nose - normal and patent, no erythema, discharge.   Mouth - mucous membranes moist.  Neck - supple, no significant adenopathy  Chest - diminished breath sounds,

## 2018-01-24 NOTE — PROGRESS NOTES
therapy to increase independence with  ADL of bathing, dressing, toileting and grooming. Writer recommending SNF placement for for activity tolerance and strength which will increase independence with ADL's coordinated with bed mobility and chair transfers. Continued skilled OT services to address decreased safety awareness with ADL and IADL tasks and for education and increased independence with DME and AE for fall prevention and ec/ws techniques prior to d/c home. Patient chairside with chair alarm donned and call light within reach. RN okay 'ed treatment this date. Patient medically stable for OT treatment.   JOHN Snider/RED, LMT, CLT-ALEX

## 2018-01-24 NOTE — PROGRESS NOTES
Unable to confirm due to unavailability of reagent. GLUCOSEU NEGATIVE 01/20/2018    KETUA NEGATIVE 01/20/2018    AMORPHOUS NOT REPORTED 01/20/2018     Urine Sodium:     Lab Results   Component Value Date    TEOFILO 30 01/20/2018     Urine Creatinine:     Lab Results   Component Value Date    LABCREA 225.4 01/20/2018       IMPRESSION/RECOMMENDATIONS:      1. Acute kidney injury - most consistent with prerenal azotemia and toxic acute tubular necrosis secondary to rhabdomyolysis. Renal function is back to normal.CPK level is decreased to 599 today. Plan: continue IV fluid 0.9 normal saline at 50 mL/h. Strict input and output documentation. Avoid nephrotoxic agents. Check BMP and CPK level daily. 2.Leukopenia - SPEP shows IgG kappa monoclonal gammopathy. 3.Hypertension - emphasize low salt diet.       LEVON MoffettCP  Attending Nephrologist  1/24/2018 7:43 AM

## 2018-01-24 NOTE — PROGRESS NOTES
Physical Therapy  Facility/Department: [facility] PROGRESSIVE CARE  Daily Treatment Note  NAME: Jeannie Estrella  : 1930  MRN: [id number]  Discharge Recommendation:   [address]  Date of Service: 2018    Patient Diagnosis(es):   Patient Active Problem List    Diagnosis Date Noted    Encephalomalacia on imaging study 2018    Neutropenia ([facility] [address]     Non-traumatic rhabdomyolysis     Rhabdomyolysis 2018    Pulmonary fibrosis ([facility] [address] 2018    Acute kidney injury ([facility] [address] 2018    Encephalopathy 2018       Past Medical History:   Diagnosis Date    Cerebral artery occlusion with cerebral infarction ([facility] [address]     tia    Diabetes mellitus ([facility] [address]     Glaucoma     Hypertension      Past Surgical History:   Procedure Laterality Date    EYE SURGERY         Restrictions  Restrictions/Precautions  Restrictions/Precautions: Fall Risk  Required Braces or Orthoses?: No  Position Activity Restriction  Other position/activity restrictions: O2, 2L per nc     Subjective   General  Chart Reviewed: Yes  Subjective  Subjective: Patient in chair upon arrival requesting to get back to bed  Pain Screening  Patient Currently in Pain: Denies  Pain Assessment  Pain Assessment: 0-10  Pain Level: 0  Vital Signs  Patient Currently in Pain: Denies  Oxygen Therapy  O2 Device: Nasal cannula (2 L)         Objective   Bed mobility  Scooting: Supervision  Rolling: Supervision  Sit to supine: Supervision  Transfers  Sit to Stand: Contact guard assistance  Stand to sit: Contact guard assistance  Stand Pivot Transfers: Contact guard assistance  Ambulation  Ambulation?: No  Balance  Posture: Fair  Sitting - Static: Good  Sitting - Dynamic: Good  Standing - Static: Fair;+  Standing - Dynamic: Fair  Comment: Encouraged patient to participate in exercise, patient refused LE AROM but willing to work on UE. After a few bicep curls, patient became aggitated and wanted to get back to bed.  Assited patient to comfortable position        Assessment   Body structures, Functions, Activity limitations: Decreased functional mobility ; Decreased strength;Decreased endurance;Decreased balance  Assessment: Patient understands exercises, requires max encouragement to attempt activity. Patient has not demonstrated ambulation for us this session, but able to transfer from chair to bed with SBA/CGA  Activity Tolerance  Activity Tolerance: Treatment limited secondary to agitation     Discharge Recommendations:  Subacute/Skilled Nursing Facility       Goals  Short term goals  Time Frame for Short term goals: 12 tx's  Short term goal 1: Transfers independnet  Short term goal 2: Amb x 150 ft with r.walker independnet  Short term goal 3: Steps x 2 with rail independent  Short term goal 4: Pt able to tolerate 45 min of ther ex and act to increase safety at d/c  Short term goal 5: Pt standing balance - SBA without device for gait   Patient Goals   Patient goals : Pt goal is to get his walking strength back    Plan    Plan  Times per week: 1-2x/day. 5-6x/ week   Current Treatment Recommendations: Strengthening, Balance Training, Functional Mobility Training, Transfer Training, Endurance Training, Gait Training, Stair training, Safety Education & Training, Positioning, Home Exercise Program, Patient/Caregiver Education & Training, Neuromuscular Re-education  Safety Devices  Type of devices:  All fall risk precautions in place, Bed alarm in place, Call light within reach, Left in bed, Nurse notified     Therapy Time   Individual Concurrent Group Co-treatment   Time In  1130         Time Out  1138         Minutes  8                    Treatment & documentation completed by Genny Ceja, Student Physical Therapist Assistant    co-signed by [address] [place] [id number], PT

## 2018-01-24 NOTE — CARE COORDINATION
BREANA completed [facility] referral with pt family and pt RN, SW had pt sign the referral, SW faxed the referral to the [place].
Pt case discussed during quality flow rounds pt was discussed. The therapy team are recommending SNF, Pt RN Tammy Heck informed SW the pt family would like referral to [address] [place] [address]. SW explained this is a lengthy process and will start to work on the referral.   BREANA called the [address] to obtain information regarding making the referral.  Julio Martinez reports the SW will need to speak with pt medical social worker with the [place] before moving forward. BREANA left message for pt Ariel Sanford at the [place] to call once message has been received to make referral to the [place] SNF in [place].   BREANA await call back from Jamilah the  at the [address] [place] [address]
speak with his dtrs. Called to [facility] Waldronr [phone]. verena morelos. Called and spoke with Doreatha Felty [phone] ( w [phone]). Pt lives with his dtr Mala Zavala. Mala Zavala and all 3 dtrs work during the day. Pt has scooter at home, but Erica rios pt has been banded from using scooter. States he gets lost or goes to far and battery runs out. Pt has been at a SNF in the past but she cannot remember the name. Erica Salinas is DPOA. Jewell Yovany states family will agree with SNF. Referral made to SW to follow PT/OT eval.  LEWIS initiated.          Electronically signed by Nicolas Cuevas RN on 1/22/18 at 5:31 PM

## 2018-01-24 NOTE — PLAN OF CARE
Problem: Falls - Risk of  Goal: Absence of falls  Outcome: Met This Shift  Patient is a high fall risk per falls risk assessment. Remains free from falls and injuries, call light at reach and utilized appropriately. Bed in lowest position with wheels locked and alarm armed. Personal items that are used frequently are within reach.  No attempts to get out of bed unassisted noted or reported, falling star program and hourly rounding implemented, will continue to monitor and educate as needed

## 2018-01-24 NOTE — PLAN OF CARE
Problem: Falls - Risk of  Goal: Absence of falls  Outcome: Ongoing  Pt determined by Sommer Garcia fall risk assessment to be a fall risk; falling star, ID band and safety alarm activated and in use as needed. Hourly rounding performed, call light use encouraged; personal items within reach. Bed locked in low position.

## 2018-01-25 ENCOUNTER — APPOINTMENT (OUTPATIENT)
Dept: GENERAL RADIOLOGY | Age: 83
DRG: 557 | End: 2018-01-25
Payer: MEDICARE

## 2018-01-25 ENCOUNTER — APPOINTMENT (OUTPATIENT)
Dept: CT IMAGING | Age: 83
DRG: 557 | End: 2018-01-25
Payer: MEDICARE

## 2018-01-25 LAB
ABSOLUTE EOS #: 0.09 K/UL (ref 0–0.4)
ABSOLUTE IMMATURE GRANULOCYTE: ABNORMAL K/UL (ref 0–0.3)
ABSOLUTE LYMPH #: 0.7 K/UL (ref 1–4.8)
ABSOLUTE MONO #: 0.29 K/UL (ref 0.2–0.8)
ABSOLUTE RETIC #: 0.03 M/UL (ref 0.02–0.1)
ANION GAP SERPL CALCULATED.3IONS-SCNC: 8 MMOL/L (ref 9–17)
BASOPHILS # BLD: 1 %
BASOPHILS ABSOLUTE: 0.02 K/UL (ref 0–0.2)
BUN BLDV-MCNC: 13 MG/DL (ref 8–23)
BUN/CREAT BLD: 14 (ref 9–20)
CALCIUM SERPL-MCNC: 7.3 MG/DL (ref 8.6–10.4)
CHLORIDE BLD-SCNC: 110 MMOL/L (ref 98–107)
CO2: 21 MMOL/L (ref 20–31)
CREAT SERPL-MCNC: 0.95 MG/DL (ref 0.7–1.2)
DIFFERENTIAL TYPE: ABNORMAL
EOSINOPHILS RELATIVE PERCENT: 5 % (ref 1–4)
GFR AFRICAN AMERICAN: >60 ML/MIN
GFR NON-AFRICAN AMERICAN: >60 ML/MIN
GFR SERPL CREATININE-BSD FRML MDRD: ABNORMAL ML/MIN/{1.73_M2}
GFR SERPL CREATININE-BSD FRML MDRD: ABNORMAL ML/MIN/{1.73_M2}
GLUCOSE BLD-MCNC: 114 MG/DL (ref 70–99)
HCT VFR BLD CALC: 27.4 % (ref 41–53)
HEMOGLOBIN: 9.1 G/DL (ref 13.5–17.5)
IMMATURE GRANULOCYTES: ABNORMAL %
IMMATURE RETIC FRACT: NORMAL %
LYMPHOCYTES # BLD: 42 % (ref 24–44)
MCH RBC QN AUTO: 31.4 PG (ref 26–34)
MCHC RBC AUTO-ENTMCNC: 33.1 G/DL (ref 31–37)
MCV RBC AUTO: 95 FL (ref 80–100)
MONOCYTES # BLD: 17 % (ref 1–7)
MYOGLOBIN: 107 NG/ML (ref 28–72)
NEUTROPHIL AB, FLOW: NEGATIVE
NRBC AUTOMATED: ABNORMAL PER 100 WBC
PDW BLD-RTO: 13.2 % (ref 11.5–14.5)
PLATELET # BLD: 258 K/UL (ref 130–400)
PLATELET ESTIMATE: ABNORMAL
PMV BLD AUTO: ABNORMAL FL (ref 6–12)
POTASSIUM SERPL-SCNC: 3.9 MMOL/L (ref 3.7–5.3)
RBC # BLD: 2.88 M/UL (ref 4.5–5.9)
RBC # BLD: ABNORMAL 10*6/UL
RETIC %: 0.9 % (ref 0.5–2)
RETIC HEMOGLOBIN: NORMAL PG (ref 28.2–35.7)
SEG NEUTROPHILS: 35 % (ref 36–66)
SEGMENTED NEUTROPHILS ABSOLUTE COUNT: 0.6 K/UL (ref 1.8–7.7)
SODIUM BLD-SCNC: 139 MMOL/L (ref 135–144)
TOTAL CK: 266 U/L (ref 39–308)
WBC # BLD: 1.7 K/UL (ref 3.5–11)
WBC # BLD: ABNORMAL 10*3/UL

## 2018-01-25 PROCEDURE — 2580000003 HC RX 258: Performed by: INTERNAL MEDICINE

## 2018-01-25 PROCEDURE — 85025 COMPLETE CBC W/AUTO DIFF WBC: CPT

## 2018-01-25 PROCEDURE — 94640 AIRWAY INHALATION TREATMENT: CPT

## 2018-01-25 PROCEDURE — 85045 AUTOMATED RETICULOCYTE COUNT: CPT

## 2018-01-25 PROCEDURE — 80048 BASIC METABOLIC PNL TOTAL CA: CPT

## 2018-01-25 PROCEDURE — 6360000002 HC RX W HCPCS: Performed by: NURSE PRACTITIONER

## 2018-01-25 PROCEDURE — 6370000000 HC RX 637 (ALT 250 FOR IP): Performed by: NURSE PRACTITIONER

## 2018-01-25 PROCEDURE — 97110 THERAPEUTIC EXERCISES: CPT

## 2018-01-25 PROCEDURE — 71045 X-RAY EXAM CHEST 1 VIEW: CPT

## 2018-01-25 PROCEDURE — 88360 TUMOR IMMUNOHISTOCHEM/MANUAL: CPT

## 2018-01-25 PROCEDURE — 99232 SBSQ HOSP IP/OBS MODERATE 35: CPT | Performed by: INTERNAL MEDICINE

## 2018-01-25 PROCEDURE — 6370000000 HC RX 637 (ALT 250 FOR IP): Performed by: INTERNAL MEDICINE

## 2018-01-25 PROCEDURE — 83874 ASSAY OF MYOGLOBIN: CPT

## 2018-01-25 PROCEDURE — 38221 DX BONE MARROW BIOPSIES: CPT

## 2018-01-25 PROCEDURE — 36415 COLL VENOUS BLD VENIPUNCTURE: CPT

## 2018-01-25 PROCEDURE — 2700000000 HC OXYGEN THERAPY PER DAY

## 2018-01-25 PROCEDURE — 07DR3ZX EXTRACTION OF ILIAC BONE MARROW, PERCUTANEOUS APPROACH, DIAGNOSTIC: ICD-10-PCS | Performed by: RADIOLOGY

## 2018-01-25 PROCEDURE — 6360000002 HC RX W HCPCS: Performed by: RADIOLOGY

## 2018-01-25 PROCEDURE — 94760 N-INVAS EAR/PLS OXIMETRY 1: CPT

## 2018-01-25 PROCEDURE — 88185 FLOWCYTOMETRY/TC ADD-ON: CPT

## 2018-01-25 PROCEDURE — 88305 TISSUE EXAM BY PATHOLOGIST: CPT

## 2018-01-25 PROCEDURE — 94664 DEMO&/EVAL PT USE INHALER: CPT

## 2018-01-25 PROCEDURE — 88184 FLOWCYTOMETRY/ TC 1 MARKER: CPT

## 2018-01-25 PROCEDURE — 84156 ASSAY OF PROTEIN URINE: CPT

## 2018-01-25 PROCEDURE — 97535 SELF CARE MNGMENT TRAINING: CPT

## 2018-01-25 PROCEDURE — 82550 ASSAY OF CK (CPK): CPT

## 2018-01-25 PROCEDURE — 2060000000 HC ICU INTERMEDIATE R&B

## 2018-01-25 PROCEDURE — 88280 CHROMOSOME KARYOTYPE STUDY: CPT

## 2018-01-25 PROCEDURE — 88237 TISSUE CULTURE BONE MARROW: CPT

## 2018-01-25 PROCEDURE — 97116 GAIT TRAINING THERAPY: CPT

## 2018-01-25 PROCEDURE — 2500000003 HC RX 250 WO HCPCS: Performed by: NURSE PRACTITIONER

## 2018-01-25 PROCEDURE — 88311 DECALCIFY TISSUE: CPT

## 2018-01-25 PROCEDURE — 97530 THERAPEUTIC ACTIVITIES: CPT

## 2018-01-25 PROCEDURE — 77012 CT SCAN FOR NEEDLE BIOPSY: CPT

## 2018-01-25 PROCEDURE — 88313 SPECIAL STAINS GROUP 2: CPT

## 2018-01-25 PROCEDURE — 88264 CHROMOSOME ANALYSIS 20-25: CPT

## 2018-01-25 RX ORDER — FENTANYL CITRATE 50 UG/ML
INJECTION, SOLUTION INTRAMUSCULAR; INTRAVENOUS
Status: COMPLETED | OUTPATIENT
Start: 2018-01-25 | End: 2018-01-25

## 2018-01-25 RX ORDER — AZITHROMYCIN 250 MG/1
500 TABLET, FILM COATED ORAL EVERY 24 HOURS
Status: DISCONTINUED | OUTPATIENT
Start: 2018-01-25 | End: 2018-01-26

## 2018-01-25 RX ORDER — IPRATROPIUM BROMIDE AND ALBUTEROL SULFATE 2.5; .5 MG/3ML; MG/3ML
1 SOLUTION RESPIRATORY (INHALATION)
Status: DISCONTINUED | OUTPATIENT
Start: 2018-01-25 | End: 2018-01-26 | Stop reason: HOSPADM

## 2018-01-25 RX ADMIN — LATANOPROST 1 DROP: 50 SOLUTION OPHTHALMIC at 21:39

## 2018-01-25 RX ADMIN — BENZONATATE 100 MG: 100 CAPSULE ORAL at 03:00

## 2018-01-25 RX ADMIN — TAMSULOSIN HYDROCHLORIDE 0.4 MG: 0.4 CAPSULE ORAL at 09:24

## 2018-01-25 RX ADMIN — GUAIFENESIN AND CODEINE PHOSPHATE 5 ML: 10; 100 LIQUID ORAL at 18:15

## 2018-01-25 RX ADMIN — BRIMONIDINE TARTRATE 1 DROP: 2 SOLUTION OPHTHALMIC at 13:28

## 2018-01-25 RX ADMIN — ASPIRIN 81 MG: 81 TABLET, COATED ORAL at 09:24

## 2018-01-25 RX ADMIN — CEFTRIAXONE SODIUM 1 G: 10 INJECTION, POWDER, FOR SOLUTION INTRAVENOUS at 21:39

## 2018-01-25 RX ADMIN — MOMETASONE FUROATE AND FORMOTEROL FUMARATE DIHYDRATE 2 PUFF: 200; 5 AEROSOL RESPIRATORY (INHALATION) at 09:41

## 2018-01-25 RX ADMIN — GUAIFENESIN AND CODEINE PHOSPHATE 5 ML: 10; 100 LIQUID ORAL at 09:21

## 2018-01-25 RX ADMIN — VITAMIN D, TAB 1000IU (100/BT) 1000 UNITS: 25 TAB at 09:24

## 2018-01-25 RX ADMIN — FENTANYL CITRATE 50 MCG: 50 INJECTION, SOLUTION INTRAMUSCULAR; INTRAVENOUS at 14:16

## 2018-01-25 RX ADMIN — TIMOLOL MALEATE 1 DROP: 5 SOLUTION OPHTHALMIC at 13:28

## 2018-01-25 RX ADMIN — BRIMONIDINE TARTRATE 1 DROP: 2 SOLUTION OPHTHALMIC at 09:24

## 2018-01-25 RX ADMIN — IPRATROPIUM BROMIDE AND ALBUTEROL SULFATE 1 AMPULE: .5; 3 SOLUTION RESPIRATORY (INHALATION) at 21:07

## 2018-01-25 RX ADMIN — TIMOLOL MALEATE 1 DROP: 5 SOLUTION OPHTHALMIC at 21:40

## 2018-01-25 RX ADMIN — MOMETASONE FUROATE AND FORMOTEROL FUMARATE DIHYDRATE 2 PUFF: 200; 5 AEROSOL RESPIRATORY (INHALATION) at 21:07

## 2018-01-25 RX ADMIN — DORZOLAMIDE HYDROCHLORIDE 1 DROP: 20 SOLUTION/ DROPS OPHTHALMIC at 09:24

## 2018-01-25 RX ADMIN — TIMOLOL MALEATE 1 DROP: 5 SOLUTION OPHTHALMIC at 09:24

## 2018-01-25 RX ADMIN — DORZOLAMIDE HYDROCHLORIDE 1 DROP: 20 SOLUTION/ DROPS OPHTHALMIC at 21:39

## 2018-01-25 RX ADMIN — BRIMONIDINE TARTRATE 1 DROP: 2 SOLUTION OPHTHALMIC at 21:40

## 2018-01-25 RX ADMIN — IPRATROPIUM BROMIDE AND ALBUTEROL SULFATE 1 AMPULE: .5; 3 SOLUTION RESPIRATORY (INHALATION) at 15:39

## 2018-01-25 RX ADMIN — DORZOLAMIDE HYDROCHLORIDE 1 DROP: 20 SOLUTION/ DROPS OPHTHALMIC at 13:28

## 2018-01-25 RX ADMIN — SODIUM CHLORIDE: 9 INJECTION, SOLUTION INTRAVENOUS at 03:00

## 2018-01-25 RX ADMIN — AZITHROMYCIN 500 MG: 250 TABLET, FILM COATED ORAL at 18:15

## 2018-01-25 ASSESSMENT — PAIN DESCRIPTION - LOCATION: LOCATION: NECK

## 2018-01-25 ASSESSMENT — PAIN SCALES - GENERAL
PAINLEVEL_OUTOF10: 0
PAINLEVEL_OUTOF10: 1
PAINLEVEL_OUTOF10: 0

## 2018-01-25 NOTE — PROGRESS NOTES
MD   1,000 Units at 18 0924    dorzolamide (TRUSOPT) 2 % ophthalmic solution 1 drop  1 drop Both Eyes TID Sigrid Cowan MD   1 drop at 18 6216    And    timolol (TIMOPTIC) 0.5 % ophthalmic solution 1 drop  1 drop Both Eyes TID Sigrid Cowan MD   1 drop at 18 5873       Allergies:  Review of patient's allergies indicates no known allergies. REVIEW OF SYSTEMS:    Constitutional: ++fatigue, No fever or chills. No night sweats, no weight loss   Eyes: No eye discharge, double vision, or eye pain   HEENT: negative for sore mouth, sore throat, hoarseness and voice change   Respiratory: ++ cough , sputum, dyspnea, wheezing, hemoptysis, chest pain   Cardiovascular: negative for chest pain, dyspnea, palpitations, orthopnea, PND   Gastrointestinal: negative for nausea, vomiting, diarrhea, constipation, abdominal pain, Dysphagia, hematemesis and hematochezia   Genitourinary: negative for frequency, dysuria, nocturia, urinary incontinence, and hematuria   Integument: negative for rash, skin lesions, bruises.    Hematologic/Lymphatic: negative for easy bruising, bleeding, lymphadenopathy, or petechiae   Endocrine: negative for heat or cold intolerance,weight changes, change in bowel habits and hair loss   Musculoskeletal: negative for myalgias, arthralgias, pain, joint swelling,and bone pain   Neurological: negative for headaches, dizziness, seizures, weakness, numbness    PHYSICAL EXAM:      /66   Pulse 83   Temp 98.2 °F (36.8 °C) (Oral)   Resp 20   Wt 147 lb (66.7 kg)   SpO2 100%    Temp (24hrs), Av.3 °F (36.8 °C), Min:97.5 °F (36.4 °C), Max:99 °F (37.2 °C)  General appearance - elderly, well appearing, no in pain or distress   Mental status - alert and cooperative   Eyes - pupils equal and reactive, extraocular eye movements intact   Ears - bilateral TM's and external ear canals normal   Mouth - mucous membranes moist, pharynx normal without lesions   Neck - supple, no significant chronically low WBC at since 2014. WBC noted at that time 3.0. Possibility of low grade underlying BM disorder can not be ruled out. 4.  Anemia  5. Encephalopathy  6. Fall    RECOMMENDATIONS:  1. I discussed with pt about possible etiologies of leukopenia. Most likely autoimmune  2. Slight toxic granulations noted on blood smear with hypo proliferative picture  3. WBC slowly dropped slightly  4. Discusse with patient and daughter about getting BM biopsy and risk and benefits. Pt and the daughter agreeable  5. Mild elevation of light chains and SPE showing 0.27 gm IgG kappa paraprotein. 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 6. Recommend rheumatology evaluation  7. Plan for BM biopsy today or tomorrow. 8. Will continue to follow      Discussed with patient and Nurse. Thank you for asking us to see this patient.     Leatha Garibay MD  Hematologist/Medical Oncologist  Cell: [phone]

## 2018-01-25 NOTE — PROGRESS NOTES
allergies. Social History:   reports that he has quit smoking. He has never used smokeless tobacco. He reports that he does not drink alcohol or use drugs. Family History: family history is not on file. REVIEW OF SYSTEMS:    Constitutional: ++fatigue, No fever or chills. No night sweats, no weight loss   Eyes: No eye discharge, double vision, or eye pain   HEENT: negative for sore mouth, sore throat, hoarseness and voice change   Respiratory: ++ cough , sputum, dyspnea, wheezing, hemoptysis, chest pain   Cardiovascular: negative for chest pain, dyspnea, palpitations, orthopnea, PND   Gastrointestinal: negative for nausea, vomiting, diarrhea, constipation, abdominal pain, Dysphagia, hematemesis and hematochezia   Genitourinary: negative for frequency, dysuria, nocturia, urinary incontinence, and hematuria   Integument: negative for rash, skin lesions, bruises.    Hematologic/Lymphatic: negative for easy bruising, bleeding, lymphadenopathy, or petechiae   Endocrine: negative for heat or cold intolerance,weight changes, change in bowel habits and hair loss   Musculoskeletal: negative for myalgias, arthralgias, pain, joint swelling,and bone pain   Neurological: negative for headaches, dizziness, seizures, weakness, numbness    PHYSICAL EXAM:      /70   Pulse 86   Temp 98.1 °F (36.7 °C) (Oral)   Resp 22   Wt 147 lb (66.7 kg)   SpO2 98%    Temp (24hrs), Av.8 °F (36.6 °C), Min:97.2 °F (36.2 °C), Max:98.2 °F (36.8 °C)  General appearance - elderly, well appearing, no in pain or distress   Mental status - alert and cooperative   Eyes - pupils equal and reactive, extraocular eye movements intact   Ears - bilateral TM's and external ear canals normal   Mouth - mucous membranes moist, pharynx normal without lesions   Neck - supple, no significant adenopathy   Lymphatics - no palpable lymphadenopathy, no hepatosplenomegaly   Chest - clear to auscultation, no wheezes, rales or rhonchi, symmetric air entry Heart - normal rate, regular rhythm, normal S1, S2, no murmurs  Abdomen - soft, nontender, nondistended, no masses or organomegaly   Neurological - alert, oriented, normal speech, no focal findings or movement disorder noted   Musculoskeletal - no joint tenderness, deformity or swelling   Extremities - peripheral pulses normal, no pedal edema, no clubbing or cyanosis   Skin - normal coloration and turgor, no rashes, no suspicious skin lesions noted ,    DATA:    Labs:   CBC:   Recent Labs      01/23/18   1408  01/24/18   1336   WBC  1.6*  1.8*   HGB  9.6*  10.4*   HCT  29.3*  31.6*   PLT  197  239     BMP:   Recent Labs      01/23/18   0450  01/24/18   0400   NA  138  140   K  4.5  3.9   CO2  21  20   BUN  22  15   CREATININE  1.17  0.92   LABGLOM  59*  >60   GLUCOSE  103*  100*     PT/INR: No results for input(s): PROTIME, INR in the last 72 hours. Ref. Range 1/22/2018 12:08   Ferritin Latest Ref Range: 30 - 400 ug/L 788 (H)   Iron Latest Ref Range: 59 - 158 ug/dL 27 (L)   Iron Saturation Latest Ref Range: 20 - 55 % 28   UIBC Latest Ref Range: 112 - 347 ug/dL 70 (L)   TIBC Latest Ref Range: 250 - 450 ug/dL 97 (L)   Folate Latest Ref Range: >4.8 ng/mL 14.2   Vitamin B-12 Latest Ref Range: 232 - 1245 pg/mL 624     IMAGING DATA:  Reviewed    Primary Problem  Rhabdomyolysis    Active Hospital Problems    Diagnosis Date Noted    Monoclonal gammopathy [D47.2] 01/24/2018    Encephalomalacia on imaging study [G93.89] 01/22/2018    Neutropenia ([address] [D70.9]     Non-traumatic rhabdomyolysis [M62.82]     Rhabdomyolysis [M62.82] 01/20/2018    Pulmonary fibrosis ([address] [J84.10] 01/20/2018    Acute kidney injury ([facility] [N17.9] 01/20/2018    Encephalopathy [G93.40] 01/20/2018     IMPRESSION:   1. Rhabdomyolysis  2. MAXINE  3. Leukopenia: He seems to have chronically low WBC at since 2014. WBC noted at that time 3.0. Possibility of low grade underlying BM disorder can not be ruled out  4. Anemia  5. Encephalopathy  6. Fall    RECOMMENDATIONS:  1. I discussed with pt about possible etiologies of leukopenia. Most likely autoimmune  2. WBC slowly getting better  3. Slight toxic granulations noted on blood smear with hypo proliferative picture  4. Mild elevation of light chains and SPE showing 0.27 gm IgG kappa paraprotein. 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 5. If counts do not improve he will need BM biopsy. 6. Will continue to follow      Discussed with patient and Nurse. Thank you for asking us to see this patient.     Aura Olszewski Shelke,MD  Hematologist/Medical Oncologist  Cell: [phone]

## 2018-01-25 NOTE — PROGRESS NOTES
Occupational Therapy  DATE: 2018    NAME: Cindy Zazueta  MRN: [id number]   : 1930  Discharge Recommendation:   Subacute/Skilled Nursing Facility     18 1127   Restrictions/Precautions   Restrictions/Precautions Fall Risk   Required Braces or Orthoses? No   Position Activity Restriction   Other position/activity restrictions O2, 2L per nc   General   Chart Reviewed Yes   Patient assessed for rehabilitation services? Yes   Response to previous treatment Patient with no complaints from previous session   Family / Caregiver Present No   Diagnosis Rhabdomyolysis   General Comment   Comments increased confusion from last treatment. Patient appears irriated at times.    Pain Assessment   Patient Currently in Pain Denies   Orientation   Overall Orientation Status X   Orientation Level Disoriented to time;Disoriented to situation;Oriented to time;Disoriented to place   Attendance   Participation Active participation   ADL   UE Dressing Moderate assistance;Setup;Verbal cueing  (with gown)   Balance   Sitting Balance Supervision   Standing Balance Minimal assistance   Standing Balance   Sit to stand Minimal assistance   Stand to sit Minimal assistance   Comment s/w   Functional Mobility   Functional - Mobility Device Standard Walker   Activity Other   Assist Level Minimal assistance   Functional Mobility Comments would benefit from r/w   Bed mobility   Bridging Moderate assistance   Rolling to Left Moderate assistance   Rolling to Right Moderate assistance   Supine to Sit Moderate assistance   Comment increased confusion this date   Transfers   Stand Step Transfers Minimal assistance   Stand Pivot Transfers Minimal assistance   Sit Pivot Transfers Minimal assistance   Transfer Comments s/w   Cognition   Overall Cognitive Status Impaired   Safety Judgement Decreased awareness of need for assistance   Awareness of Errors Assistance required to identify errors made   Insights Decreased awareness of deficits

## 2018-01-25 NOTE — PLAN OF CARE
Problem: Falls - Risk of  Goal: Absence of falls  Outcome: Ongoing  Pt determined by Debra Puri fall risk assessment to be a fall risk; falling star, ID band and safety alarm activated and in use as needed. Hourly rounding performed, call light use encouraged; personal items within reach. Bed locked in low position.

## 2018-01-25 NOTE — PROGRESS NOTES
Known Allergies  Principal Problem:    Rhabdomyolysis  Active Problems:    Pulmonary fibrosis (HCC)    Acute kidney injury ([address]    Encephalopathy    Non-traumatic rhabdomyolysis    Encephalomalacia on imaging study    Neutropenia (HCC)    Monoclonal gammopathy    Blood pressure (!) 141/67, pulse 87, temperature 98.8 °F (37.1 °C), temperature source Oral, resp. rate 22, weight 147 lb (66.7 kg), SpO2 100 %. Subjective:  Symptoms:  (No new neuro issues. ). Objective:  General Appearance:  Comfortable. Vital signs: (most recent): Blood pressure (!) 141/67, pulse 87, temperature 98.8 °F (37.1 °C), temperature source Oral, resp. rate 22, weight 147 lb (66.7 kg), SpO2 100 %. Vital signs are normal.    Lungs:  Normal effort. Heart: Normal rate. Neurological: (Resting comfortably. ). Assessment:  (Encephalopathy cleared. Awaiting placement. ).        Benedicto Gerard MD  1/25/2018

## 2018-01-25 NOTE — BRIEF OP NOTE
Brief Postoperative Note    Curt Quintero  YOB: 1930  [id number]    Pre-operative Diagnosis: Neutropenia    Post-operative Diagnosis: Same    Procedure: CT guided bone marrow biopsy and aspiration     Anesthesia: Local    Surgeons/Assistants: Alice Gagnon MD    Estimated Blood Loss: less than 50     Complications: None    Specimens: Was Obtained: 14 mL of bone marrow aspirate and single 11 gauge core from right posterior iliac crest     Findings: As above     Electronically signed by Alice Gagnon MD on 1/25/2018 at 2:55 PM

## 2018-01-25 NOTE — PROGRESS NOTES
Physical Therapy  Facility/Department: [facility] PROGRESSIVE CARE  Daily Treatment Note  NAME: Dusty Victoria  : 1930  MRN: [id number]  Discharge Recommendation:   [address]  Date of Service: 2018    Patient Diagnosis(es):   Patient Active Problem List    Diagnosis Date Noted    Monoclonal gammopathy 2018    Encephalomalacia on imaging study 2018    Neutrop[facility]     Non-traumatic rhabdomyolysis     Rhabdomyolysis 2018    Pulmonary fibrosis ([facility] [facility] 2018    Acute kidney injury ([facility] [facility] 2018    Encephalopathy 2018       Past Medical History:   Diagnosis Date    Cerebral artery occlusion with cerebral infarction ([facility]     tia    Diabetes mellitus ([facility] [facility]     Glaucoma     Hypertension      Past Surgical History:   Procedure Laterality Date    EYE SURGERY         Restrictions  Restrictions/Precautions  Restrictions/Precautions: Fall Risk  Required Braces or Orthoses?: No  Position Activity Restriction  Other position/activity restrictions: O2, 2L per nc     Subjective   General  Chart Reviewed: Yes  Subjective  Subjective: Patient resting, agreeable to PT with some encouragement   Pain Screening  Patient Currently in Pain: Denies  Vital Signs  Patient Currently in Pain: Denies  Oxygen Therapy  O2 Device: Nasal cannula (2 L)          Objective   Bed mobility  Scooting: Supervision  Rolling: Supervision  Supine to sit: Supervision  Transfers  Sit to Stand: Contact guard assistance  Stand to sit: Contact guard assistance  Stand Pivot Transfers: Contact guard assistance  Ambulation  Ambulation?: Yes  Ambulation 1  Surface: level tile  Device: Rolling Walker  Other Apparatus: O2  Assistance: Minimal assistance  Quality of Gait: short, shuffling steps  Distance: 10 ft x 1, 30 ft x 1  Comments: Patient requires assistance with negotiating lines and manuevering RW   Balance  Posture: Fair  Sitting - Static: Good  Sitting - Dynamic: Good  Standing - Static: devices:  All fall risk precautions in place, Call light within reach, Chair alarm in place, Gait belt, Left in chair, Nurse notified     Therapy Time   Individual Concurrent Group Co-treatment   Time In           Time Out  1225         Minutes  [address] Dana[address] Henao, Student Physical Therapist Assistant

## 2018-01-26 ENCOUNTER — APPOINTMENT (OUTPATIENT)
Dept: GENERAL RADIOLOGY | Age: 83
DRG: 557 | End: 2018-01-26
Payer: MEDICARE

## 2018-01-26 VITALS
SYSTOLIC BLOOD PRESSURE: 155 MMHG | HEART RATE: 90 BPM | WEIGHT: 147 LBS | HEIGHT: 71 IN | RESPIRATION RATE: 18 BRPM | TEMPERATURE: 99 F | OXYGEN SATURATION: 100 % | DIASTOLIC BLOOD PRESSURE: 66 MMHG

## 2018-01-26 PROBLEM — D89.89 AUTOIMMUNE DISORDER (HCC): Status: ACTIVE | Noted: 2018-01-26

## 2018-01-26 LAB
ANION GAP SERPL CALCULATED.3IONS-SCNC: 9 MMOL/L (ref 9–17)
BUN BLDV-MCNC: 10 MG/DL (ref 8–23)
BUN/CREAT BLD: 11 (ref 9–20)
CALCIUM SERPL-MCNC: 7.5 MG/DL (ref 8.6–10.4)
CHLORIDE BLD-SCNC: 113 MMOL/L (ref 98–107)
CO2: 21 MMOL/L (ref 20–31)
CREAT SERPL-MCNC: 0.88 MG/DL (ref 0.7–1.2)
CULTURE: NORMAL
GFR AFRICAN AMERICAN: >60 ML/MIN
GFR NON-AFRICAN AMERICAN: >60 ML/MIN
GFR SERPL CREATININE-BSD FRML MDRD: ABNORMAL ML/MIN/{1.73_M2}
GFR SERPL CREATININE-BSD FRML MDRD: ABNORMAL ML/MIN/{1.73_M2}
GLUCOSE BLD-MCNC: 106 MG/DL (ref 70–99)
HOURS COLLECTED: 24 H
Lab: NORMAL
Lab: NORMAL
MYOGLOBIN: 78 NG/ML (ref 28–72)
POTASSIUM SERPL-SCNC: 4.1 MMOL/L (ref 3.7–5.3)
PROTEIN 24 HOUR URINE: 518 MG/24 H
PROTEIN,TOT TIMED UR: ABNORMAL MG/X H
SODIUM BLD-SCNC: 143 MMOL/L (ref 135–144)
SPECIMEN DESCRIPTION: NORMAL
STATUS: NORMAL
STATUS: NORMAL
TOTAL CK: 150 U/L (ref 39–308)
URINE TOTAL PROTEIN: 39 MG/DL
VOLUME: 1329 ML

## 2018-01-26 PROCEDURE — 74230 X-RAY XM SWLNG FUNCJ C+: CPT

## 2018-01-26 PROCEDURE — 2700000000 HC OXYGEN THERAPY PER DAY

## 2018-01-26 PROCEDURE — 97535 SELF CARE MNGMENT TRAINING: CPT

## 2018-01-26 PROCEDURE — G8997 SWALLOW GOAL STATUS: HCPCS

## 2018-01-26 PROCEDURE — 83874 ASSAY OF MYOGLOBIN: CPT

## 2018-01-26 PROCEDURE — 80048 BASIC METABOLIC PNL TOTAL CA: CPT

## 2018-01-26 PROCEDURE — 6370000000 HC RX 637 (ALT 250 FOR IP): Performed by: NURSE PRACTITIONER

## 2018-01-26 PROCEDURE — 99232 SBSQ HOSP IP/OBS MODERATE 35: CPT | Performed by: INTERNAL MEDICINE

## 2018-01-26 PROCEDURE — 97116 GAIT TRAINING THERAPY: CPT

## 2018-01-26 PROCEDURE — 99239 HOSP IP/OBS DSCHRG MGMT >30: CPT | Performed by: INTERNAL MEDICINE

## 2018-01-26 PROCEDURE — 94760 N-INVAS EAR/PLS OXIMETRY 1: CPT

## 2018-01-26 PROCEDURE — 97530 THERAPEUTIC ACTIVITIES: CPT

## 2018-01-26 PROCEDURE — 92611 MOTION FLUOROSCOPY/SWALLOW: CPT

## 2018-01-26 PROCEDURE — 6370000000 HC RX 637 (ALT 250 FOR IP): Performed by: INTERNAL MEDICINE

## 2018-01-26 PROCEDURE — 94640 AIRWAY INHALATION TREATMENT: CPT

## 2018-01-26 PROCEDURE — G8996 SWALLOW CURRENT STATUS: HCPCS

## 2018-01-26 PROCEDURE — 82550 ASSAY OF CK (CPK): CPT

## 2018-01-26 PROCEDURE — 36415 COLL VENOUS BLD VENIPUNCTURE: CPT

## 2018-01-26 PROCEDURE — 51702 INSERT TEMP BLADDER CATH: CPT

## 2018-01-26 RX ORDER — BENZONATATE 100 MG/1
100 CAPSULE ORAL 3 TIMES DAILY PRN
Qty: 30 CAPSULE | Refills: 0
Start: 2018-01-26 | End: 2018-02-02

## 2018-01-26 RX ORDER — CEFUROXIME AXETIL 500 MG/1
500 TABLET ORAL EVERY 12 HOURS SCHEDULED
Qty: 20 TABLET | Refills: 0
Start: 2018-01-26 | End: 2018-02-05

## 2018-01-26 RX ORDER — AZITHROMYCIN 250 MG/1
250 TABLET, FILM COATED ORAL DAILY
Status: DISCONTINUED | OUTPATIENT
Start: 2018-01-26 | End: 2018-01-26 | Stop reason: HOSPADM

## 2018-01-26 RX ORDER — CEFUROXIME AXETIL 500 MG/1
500 TABLET ORAL EVERY 12 HOURS SCHEDULED
Status: DISCONTINUED | OUTPATIENT
Start: 2018-01-26 | End: 2018-01-26 | Stop reason: HOSPADM

## 2018-01-26 RX ORDER — AZITHROMYCIN 250 MG/1
250 TABLET, FILM COATED ORAL DAILY
Qty: 10 TABLET | Refills: 0 | Status: SHIPPED | OUTPATIENT
Start: 2018-01-27 | End: 2018-02-06

## 2018-01-26 RX ADMIN — DORZOLAMIDE HYDROCHLORIDE 1 DROP: 20 SOLUTION/ DROPS OPHTHALMIC at 14:35

## 2018-01-26 RX ADMIN — MOMETASONE FUROATE AND FORMOTEROL FUMARATE DIHYDRATE 2 PUFF: 200; 5 AEROSOL RESPIRATORY (INHALATION) at 07:33

## 2018-01-26 RX ADMIN — BRIMONIDINE TARTRATE 1 DROP: 2 SOLUTION OPHTHALMIC at 06:57

## 2018-01-26 RX ADMIN — AZITHROMYCIN 250 MG: 250 TABLET, FILM COATED ORAL at 12:35

## 2018-01-26 RX ADMIN — BRIMONIDINE TARTRATE 1 DROP: 2 SOLUTION OPHTHALMIC at 14:36

## 2018-01-26 RX ADMIN — IPRATROPIUM BROMIDE AND ALBUTEROL SULFATE 1 AMPULE: .5; 3 SOLUTION RESPIRATORY (INHALATION) at 11:13

## 2018-01-26 RX ADMIN — VITAMIN D, TAB 1000IU (100/BT) 1000 UNITS: 25 TAB at 09:22

## 2018-01-26 RX ADMIN — TIMOLOL MALEATE 1 DROP: 5 SOLUTION OPHTHALMIC at 14:35

## 2018-01-26 RX ADMIN — TIMOLOL MALEATE 1 DROP: 5 SOLUTION OPHTHALMIC at 09:22

## 2018-01-26 RX ADMIN — IPRATROPIUM BROMIDE AND ALBUTEROL SULFATE 1 AMPULE: .5; 3 SOLUTION RESPIRATORY (INHALATION) at 15:35

## 2018-01-26 RX ADMIN — DORZOLAMIDE HYDROCHLORIDE 1 DROP: 20 SOLUTION/ DROPS OPHTHALMIC at 09:22

## 2018-01-26 RX ADMIN — IPRATROPIUM BROMIDE AND ALBUTEROL SULFATE 1 AMPULE: .5; 3 SOLUTION RESPIRATORY (INHALATION) at 07:33

## 2018-01-26 RX ADMIN — GUAIFENESIN AND CODEINE PHOSPHATE 5 ML: 10; 100 LIQUID ORAL at 09:22

## 2018-01-26 RX ADMIN — TAMSULOSIN HYDROCHLORIDE 0.4 MG: 0.4 CAPSULE ORAL at 09:22

## 2018-01-26 ASSESSMENT — PAIN SCALES - GENERAL: PAINLEVEL_OUTOF10: 0

## 2018-01-26 NOTE — PROGRESS NOTES
Danilo Marquis is a 80 y.o. male patient.     Current Facility-Administered Medications   Medication Dose Route Frequency Provider Last Rate Last Dose    azithromycin (ZITHROMAX) tablet 250 mg  250 mg Oral Daily Staci Cooper CNP   250 mg at 01/26/18 1235    cefUROXime (CEFTIN) tablet 500 mg  500 mg Oral 2 times per day Staci Cooper CNP        ipratropium-albuterol (DUONEB) nebulizer solution 1 ampule  1 ampule Inhalation Q4H While awake Staci Cooper CNP   1 ampule at 01/26/18 1535    benzonatate (TESSALON) capsule 100 mg  100 mg Oral TID PRN Vidhi Mobley MD   100 mg at 01/25/18 0300    acetaminophen (TYLENOL) tablet 650 mg  650 mg Oral Q4H PRN Erica Manjarrez MD        guaiFENesin-codeine (GUAIFENESIN AC) 100-10 MG/5ML liquid 5 mL  5 mL Oral Q4H PRN Staci Cooper CNP   5 mL at 01/26/18 4680    mometasone-formoterol (DULERA) 200-5 MCG/ACT inhaler 2 puff  2 puff Inhalation BID Staci Cooper CNP   2 puff at 01/26/18 0733    albuterol (PROVENTIL) nebulizer solution 2.5 mg  2.5 mg Nebulization Q6H PRN Gila Magana MD        brimonidine (ALPHAGAN) 0.2 % ophthalmic solution 1 drop  1 drop Left Eye TID Erica Manjarrez MD   1 drop at 01/26/18 1436    latanoprost (XALATAN) 0.005 % ophthalmic solution 1 drop  1 drop Both Eyes Nightly Erica Manjarrez MD   1 drop at 01/25/18 2139    tamsulosin (FLOMAX) capsule 0.4 mg  0.4 mg Oral Daily Erica Manjarrez MD   0.4 mg at 01/26/18 2397    vitamin D (CHOLECALCIFEROL) tablet 1,000 Units  1,000 Units Oral Daily Erica Manjarrez MD   1,000 Units at 01/26/18 3095    dorzolamide (TRUSOPT) 2 % ophthalmic solution 1 drop  1 drop Both Eyes TID Erica Manjarrez MD   1 drop at 01/26/18 1435    And    timolol (TIMOPTIC) 0.5 % ophthalmic solution 1 drop  1 drop Both Eyes TID Erica Manjarrez MD   1 drop at 01/26/18 6008     No Known Allergies  Principal Problem:    Rhabdomyolysis  Active Problems:    Pulmonary fibrosis (HCC)    Acute kidney

## 2018-01-26 NOTE — PROGRESS NOTES
Occupational Therapy  Facility/Department: [facility] PROGRESSIVE CARE  Daily Treatment Note  NAME: Roberth Leyva  : 1930  MRN: [id number]    Date of Service: 2018     RN reports patient is medically stable for therapy treatment this date. Chart reviewed prior to treatment and patient is agreeable for therapy. All lines intact and patient positioned comfortably at end of treatment. All patient needs addressed prior to ending therapy session. Discharge Recommendation:   [address]      Patient Diagnosis(es): The encounter diagnosis was Non-traumatic rhabdomyolysis. has a past medical history of Cerebral artery occlusion with cerebral infarction ([facility] Diabetes mellitus ([facility] [address] Glaucoma; and Hypertension. has a past surgical history that includes Eye surgery. Restrictions  Restrictions/Precautions  Restrictions/Precautions: Fall Risk  Required Braces or Orthoses?: No  Position Activity Restriction  Other position/activity restrictions: O2, 2L per nc  Subjective   General  Chart Reviewed: Yes  Patient assessed for rehabilitation services?: Yes  Response to previous treatment: Patient with no complaints from previous session  Family / Caregiver Present: No  Diagnosis: Rhabdomyolysis  Subjective  Subjective: Pt. reports he \"gets tired easily\".   General Comment  Comments: Pt cooperative, did not want to sit in chair very long   Pain Assessment  Patient Currently in Pain: Denies  Vital Signs  Patient Currently in Pain: Denies   Orientation  Orientation  Overall Orientation Status: Impaired  Orientation Level: Oriented to person;Oriented to time  Objective    ADL  Grooming: Stand by assistance;Setup (Seated EOB)  Additional Comments: Declined ADLS other than oral care         Balance  Sitting Balance: Supervision  Standing Balance: Contact guard assistance (x 1 min asssit with sl LOB with walker )  Standing Balance  Time: 2 min stand EOB  Sit to stand: Contact guard

## 2018-01-26 NOTE — PROGRESS NOTES
Pulmonary Critical Care Progress Note  Radames Mario CNP / Dr. Alyssa Vergara M.D. Patient seen for the follow up of Chronic respiratory failure, pneumonia, Rhabdomyolysis     Subjective:  Shortness of breath is mild. He has an occasional nonproductive cough. He denies chest pain. No acute events overnight. Examination:  Vitals: /75   Pulse 90   Temp 98.2 °F (36.8 °C) (Oral)   Resp 18   Wt 147 lb (66.7 kg)   SpO2 98%     General appearance: alert and cooperative with exam, resting comfortably with nursing at bedside  Neck: No JVD  Lungs: Continues to have bilateral wheezes though improved compared to yesterday  Heart: regular rate and rhythm, S1, S2 normal, no gallop  Abdomen: Soft, non tender, + BS  Extremities: no cyanosis or clubbing. No significant edema    LABs:  CBC:   Recent Labs      01/24/18   1336  01/25/18   0403   WBC  1.8*  1.7*   HGB  10.4*  9.1*   HCT  31.6*  27.4*   PLT  239  258     BMP:   Recent Labs      01/25/18   0403  01/26/18   0625   NA  139  143   K  3.9  4.1   CO2  21  21   BUN  13  10   CREATININE  0.95  0.88   LABGLOM  >60  >60   GLUCOSE  114*  106*     Impression:  · Chronic respiratory failure  · COPD/Severe bolus emphysema  · Pulmonary fibrosis  · RLL Pneumonia  · MAXINE on CKD  · Rhabdomyolysis  · Hypertension/DM II  · Encephalopathy    Recommendations:  · 2 liters/min via nasal cannula  · Switch antibiotics to oral Zithromax/Ceftin  · Albuterol and ipratropium aerosols every 4 hours and as needed  · Dulera 200  · Video swallow evaluation  · He underwent bone marrow biopsy yesterday with results pending  · DVT prophylaxis with EPC's  · PT/OT  · PFT as outpatient  · Discussed with daughter  · OK for discharge planning from pulmonary stand point with follow up with pulmonary in 2-3 weeks.      Electronically signed by     Ac Saavedra CNP on 1/26/2018 at 10:55 AM  Patient was seen under the supervision of Dr. Rose Wallace and Sleep

## 2018-01-26 NOTE — PROGRESS NOTES
Progress Note    PCP: No primary care provider on file. 1/26/2018  1:28 PM    Admitting Diagnosis:  Rhabdomyolysis    Problem List:  Active Hospital Problems    Diagnosis Date Noted    Monoclonal gammopathy [D47.2] 01/24/2018    Encephalomalacia on imaging study [G93.89] 01/22/2018    Neutropenia ([facility] [address] [D70.9]     Non-traumatic rhabdomyolysis [M62.82]     Rhabdomyolysis [M62.82] 01/20/2018    Pulmonary [facility] ([email or web address] [J84.10] 01/20/2018    Acute kidney injury ([email or web address] [N17.9] 01/20/2018    Encephalopathy [G93.40] 01/20/2018       Chief Complaint: Rhabdomyolysis    Subjective: no new complaints. States he feels better. Allergies:  No Known Allergies    Current Medications:    azithromycin (ZITHROMAX) tablet 250 mg Daily   cefUROXime (CEFTIN) tablet 500 mg 2 times per day   ipratropium-albuterol (DUONEB) nebulizer solution 1 ampule Q4H While awake   benzonatate (TESSALON) capsule 100 mg TID PRN   acetaminophen (TYLENOL) tablet 650 mg Q4H PRN   guaiFENesin-codeine (GUAIFENESIN AC) 100-10 MG/5ML liquid 5 mL Q4H PRN   mometasone-formoterol (DULERA) 200-5 MCG/ACT inhaler 2 puff BID   albuterol (PROVENTIL) nebulizer solution 2.5 mg Q6H PRN   0.9 % sodium chloride infusion Continuous   brimonidine (ALPHAGAN) 0.2 % ophthalmic solution 1 drop TID   latanoprost (XALATAN) 0.005 % ophthalmic solution 1 drop Nightly   tamsulosin (FLOMAX) capsule 0.4 mg Daily   vitamin D (CHOLECALCIFEROL) tablet 1,000 Units Daily   dorzolamide (TRUSOPT) 2 % ophthalmic solution 1 drop TID   And    timolol (TIMOPTIC) 0.5 % ophthalmic solution 1 drop TID       Physical Examination:  BP (!) 150/64   Pulse 78   Temp 97.7 °F (36.5 °C) (Oral)   Resp 18   Ht 5' 11\" (1.803 m)   Wt 147 lb (66.7 kg)   SpO2 99%   General appearance - in no distress. Mental status - alert, stable mental status. Eyes - pupils equal and reactive, extraocular eye movements intact. Nose - normal and patent, no erythema, discharge.   Mouth - mucous membranes moist.  Neck - supple, no significant adenopathy. Chest - diminished breath sounds but otherwise clear to auscultation with no wheezes, rales. Heart - normal rate, regular rhythm, normal S1, S2, no murmurs, rubs, clicks or gallops. Abdomen - soft, nontender, nondistended, no masses or organomegaly. No abdominal bruits. Back exam - full range of motion, no tenderness, palpable spasm. Neurological -  no focal findings. Musculoskeletal - no joint tenderness or swelling. Extremities - peripheral pulses normal, no pedal edema, Paco's negative. Skin - normal turgor. Labs:  Hematology:  Recent Labs      01/23/18   1408  01/24/18   1336  01/25/18   0403   WBC  1.6*  1.8*  1.7*   HGB  9.6*  10.4*  9.1*   HCT  29.3*  31.6*  27.4*   PLT  197  239  258     Chemistry:  Recent Labs      01/24/18   0400  01/25/18   0403  01/26/18   0625   NA  140  139  143   K  3.9  3.9  4.1   CL  110*  110*  113*   CO2  20  21  21   GLUCOSE  100*  114*  106*   BUN  15  13  10   CREATININE  0.92  0.95  0.88   CALCIUM  7.5*  7.3*  7.5*     Recent Labs      01/24/18   0400  01/25/18   0403  01/26/18   0625   CKTOTAL  599*  266  150     Plan:  Overall stable. Cleared by specialty services for discharge to SNF. Discussed with Pulmonary and Hematology Services - will follow patient as an out-patient (including report of bone marrow studies (not available at this time). Discharge coordinator will make arrangement for out-patient Rheumatology evaluation - re: autoimmune disease (re: lupus)  Meds reconciled. LEWIS signed. [facility] Physician to assume care while in facility. Patient goes to the [place] [id number] informed me that [facility].       Electronically signed by Ronak Bennett MD on 1/26/2018 at 1:28 PM

## 2018-01-26 NOTE — PROCEDURES
[id number] [facility] [address]                 [address] [place] [address]                            ELECTROENCEPHALOGRAM REPORT    PATIENT NAME: Jae Murray                      :        1930  MED REC NO:   [id number]                             ROOM:       1591  ACCOUNT NO:   [de-identified]                           ADMIT DATE: 2018  PROVIDER:     Ilsa Florentino    DATE OF EE2018    INDICATIONS:  This patient is an 26-year-old with encephalopathy. MEDICATIONS:  As on the electronic health record. EEG DIAGNOSIS:  Dysrhythmia I - generalized. EEG DESCRIPTION:  This is a 17-channel EEG with one channel dedicated to  EKG monitoring. The resting wakeful background rhythm during the maximally  awake state is a low-amplitude discontinuous asymmetric 6 Hz theta rhythm  seen in generalized fashion without particular eye reactivity. Hyperventilation was not performed. Photic stimulation produced no  activation. The patient did not become drowsy, nor enter stage 2 sleep. EEG INTERPRETATION:  This EEG is abnormal due to the slowing and  disorganization of the background consistent with encephalopathy at most  any etiology.         AIMEE CARRASCO    D: 2018 11:34:31       T: 2018 17:27:53     [email or web address]  Job#: [id number]     Doc#: [id number]    CC:
Patient Position: Lateral and Patient Degrees: 90      Consistencies Administered: Soft solid, Puree, Thin cup    Recommended Diet:  Solid consistency: Regular  Liquid consistency: Thin  Liquid administration via: Cup    Medication administration: PO    Safe Swallow Protocol:  Supervision: Independent  Compensatory Swallowing Strategies: Alternate solids and liquids, Eat/Feed slowly, Upright as possible for all oral intake, Small bites/sips      Recommendations/Treatment  Requires SLP Intervention: No           Postural Changes and/or Swallow Maneuvers: Upright 90 degrees      Recommended Exercises:    Therapeutic Interventions: Diet tolerance monitoring         Education: Images and recommendations were reviewed with pt following this exam.   Patient Education: yes  Patient Education Response: Verbalizes understanding    Prognosis  Prognosis for safe diet advancement: good      Goals:    Long Term:     Pt. Will tolerate least restrictive diet with no overt/clinical s/s of aspiration. Short Term:  Goals: The patient will tolerate recommended diet without observed clinical signs of aspiration      Oral Preparation / Oral Phase  Oral Phase: WFL  Oral Phase: Edentulous yet functional for consistencies tested    Pharyngeal Phase  Pharyngeal Phase: WFL    Pharyngeal: Thin cup, Puree and Soft Solid: No penetration, no aspiration and no stasis. Dysphagia Outcome Severity Scale: Level 7: Normal in all situations  Penetration-Aspiration Scale (PAS): 1 - Material does not enter the airway    Esophageal Phase  Esophageal Screen: WFL        Pain   Patient Currently in Pain: No  Pain Level: 0  Pain Location: Neck    G-Code:  SLP G-Codes  Functional Limitations: Swallowing  Swallow Current Status ():  At least 1 percent but less than 20 percent impaired, limited or restricted  Swallow Goal Status (): 0 percent impaired, limited or restricted      Therapy Time:   Individual Concurrent Group Co-treatment   Time

## 2018-01-27 LAB — COMPLEMENT TOTAL (CH50): 61 CAE UNITS (ref 60–144)

## 2018-01-29 LAB — BONE MARROW REPORT: NORMAL

## 2018-01-30 LAB — FLOW CYTOMETRY, BM: NORMAL

## 2018-01-31 LAB — CHROMOSOME STUDY: NORMAL

## 2018-02-06 ENCOUNTER — HOSPITAL ENCOUNTER (OUTPATIENT)
Facility: MEDICAL CENTER | Age: 83
End: 2018-02-06
Payer: OTHER GOVERNMENT

## 2018-02-07 ENCOUNTER — TELEPHONE (OUTPATIENT)
Dept: ONCOLOGY | Age: 83
End: 2018-02-07

## 2018-02-07 ENCOUNTER — OFFICE VISIT (OUTPATIENT)
Dept: ONCOLOGY | Age: 83
End: 2018-02-07
Payer: MEDICARE

## 2018-02-07 VITALS
RESPIRATION RATE: 20 BRPM | HEART RATE: 67 BPM | SYSTOLIC BLOOD PRESSURE: 118 MMHG | TEMPERATURE: 98.6 F | WEIGHT: 154 LBS | DIASTOLIC BLOOD PRESSURE: 64 MMHG | BODY MASS INDEX: 21.48 KG/M2

## 2018-02-07 DIAGNOSIS — D47.2 MGUS (MONOCLONAL GAMMOPATHY OF UNKNOWN SIGNIFICANCE): Primary | ICD-10-CM

## 2018-02-07 DIAGNOSIS — Z00.6 PATIENT IN CLINICAL RESEARCH STUDY: ICD-10-CM

## 2018-02-07 DIAGNOSIS — R76.8 POSITIVE ANA (ANTINUCLEAR ANTIBODY): ICD-10-CM

## 2018-02-07 PROCEDURE — 1111F DSCHRG MED/CURRENT MED MERGE: CPT | Performed by: INTERNAL MEDICINE

## 2018-02-07 PROCEDURE — 4040F PNEUMOC VAC/ADMIN/RCVD: CPT | Performed by: INTERNAL MEDICINE

## 2018-02-07 PROCEDURE — 1036F TOBACCO NON-USER: CPT | Performed by: INTERNAL MEDICINE

## 2018-02-07 PROCEDURE — 1123F ACP DISCUSS/DSCN MKR DOCD: CPT | Performed by: INTERNAL MEDICINE

## 2018-02-07 PROCEDURE — G8427 DOCREV CUR MEDS BY ELIG CLIN: HCPCS | Performed by: INTERNAL MEDICINE

## 2018-02-07 PROCEDURE — 99211 OFF/OP EST MAY X REQ PHY/QHP: CPT

## 2018-02-07 PROCEDURE — 99214 OFFICE O/P EST MOD 30 MIN: CPT | Performed by: INTERNAL MEDICINE

## 2018-02-07 PROCEDURE — G8484 FLU IMMUNIZE NO ADMIN: HCPCS | Performed by: INTERNAL MEDICINE

## 2018-02-07 PROCEDURE — G8420 CALC BMI NORM PARAMETERS: HCPCS | Performed by: INTERNAL MEDICINE

## 2018-02-07 NOTE — TELEPHONE ENCOUNTER
Samantha Parham MD VISIT  DR Adilene Lott IN TO SEE PATIENT  ORDERS RECEIVED  RV 3 MONTHS W/ LABS  LABS CDP CMP SIFX SPEP KAPPA/LAMBDA FREE LIGHT CHAINS IGG IGA IGM BEING DONE @ [facility] FAX # PROVIDED TO PATIENT  REFERRAL TO RHEUMATOLOGY PATIENT'S DAUGHTER WILL SCHEDULE @ [facility] OFFICE  MD VISIT 5/2/18 @ 11AM  AVS PRINTED AND GIVEN TO PATIENT W/ INSTRUCTIONS  PATIENT DISCHARGED VIA WHEELCHAIR

## 2018-02-07 NOTE — PROGRESS NOTES
SUGGESTING ANEMIA OF CHRONIC DISEASE. Myeloid/erythroid     2.8: 1   Aspirate smears are hemodiluted and without bone marrow particles. Myeloid: erythroid ratio is within normal range.  Erythroid maturation   appears adequate.  Myeloid maturation is left shifted but blasts are   not increased and maturation to mature forms does occur.  Few   Megakaryocytes seen are morphologically unremarkable.  Lymphocytes are   not remarkable.  Plasma cells are increased and are mostly mature   without dysplasia.  Iron stain smear is negative for iron stores but   the smears hypocellular and hemodiluted     BONE MARROW BIOPSY AND ASPIRATE CLOT SECTION   Bone marrow biopsy and aspirate clot section are both adequate for   evaluation.  Bone marrow cellularity is approximately 50%, slightly   hypercellular for patient's age.  Myeloid: erythroid ratio is   compatible with about 3:1.  Erythroid and myeloid maturation are not   remarkable.  Megakaryocytes are present in normal number and   morphologically within spectrum of normal.  Lymphocytes are   unremarkable.  Plasma cells are increased and few small clusters   evident.  Bone trabeculae are attenuated and have slightly irregular   contours.  Iron stained clot section shows mildly increased iron   stores. IMMUNOHISTOCHEMICAL STAINS   Immunostains performed include , kappa and lambda light chain   (controls reactive).  On  stain, plasma cells are significantly   increased and constitute 18 % of the total marrow cellularity.  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 IMAGING DATA:    CT chest 1/20/18  Impression   1. No acute abnormalities seen in the chest   2.  Findings suggest UIP and idiopathic

## 2018-02-08 PROBLEM — Z00.6 PATIENT IN CLINICAL RESEARCH STUDY: Status: ACTIVE | Noted: 2018-02-08

## 2018-02-12 PROBLEM — N18.2 CKD (CHRONIC KIDNEY DISEASE) STAGE 2, GFR 60-89 ML/MIN: Status: ACTIVE | Noted: 2018-02-12

## 2018-02-16 ENCOUNTER — TELEPHONE (OUTPATIENT)
Dept: ONCOLOGY | Age: 83
End: 2018-02-16

## 2018-02-19 ENCOUNTER — RESEARCH ENCOUNTER (OUTPATIENT)
Dept: RESEARCH | Age: 83
End: 2018-02-19

## 2018-03-05 ENCOUNTER — TELEPHONE (OUTPATIENT)
Dept: ONCOLOGY | Age: 83
End: 2018-03-05

## 2018-03-12 ENCOUNTER — TELEPHONE (OUTPATIENT)
Dept: ONCOLOGY | Age: 83
End: 2018-03-12

## 2018-03-15 NOTE — TELEPHONE ENCOUNTER
Paperwork completed and faxed to [facility] with confirmation  Given to clerks for scanning

## 2018-04-23 ENCOUNTER — TELEPHONE (OUTPATIENT)
Dept: ONCOLOGY | Age: 83
End: 2018-04-23

## 2018-04-24 ENCOUNTER — HOSPITAL ENCOUNTER (OUTPATIENT)
Facility: MEDICAL CENTER | Age: 83
End: 2018-04-24
Payer: MEDICARE

## 2018-09-10 ENCOUNTER — HOSPITAL ENCOUNTER (OUTPATIENT)
Age: 83
Setting detail: SPECIMEN
Discharge: HOME | End: 2018-09-10
Payer: MEDICARE

## 2018-09-10 LAB
-: NORMAL
AMORPHOUS: NORMAL
ANION GAP SERPL CALCULATED.3IONS-SCNC: 12 MMOL/L (ref 9–17)
BACTERIA: NORMAL
BILIRUBIN URINE: NEGATIVE
BUN BLDV-MCNC: 39 MG/DL (ref 8–23)
BUN/CREAT BLD: ABNORMAL (ref 9–20)
CALCIUM SERPL-MCNC: 9.3 MG/DL (ref 8.6–10.4)
CASTS UA: NORMAL /LPF (ref 0–8)
CHLORIDE BLD-SCNC: 106 MMOL/L (ref 98–107)
CO2: 21 MMOL/L (ref 20–31)
COLOR: YELLOW
COMMENT UA: ABNORMAL
CREAT SERPL-MCNC: 1.87 MG/DL (ref 0.7–1.2)
CRYSTALS, UA: NORMAL /HPF
EPITHELIAL CELLS UA: NORMAL /HPF (ref 0–5)
GFR AFRICAN AMERICAN: 42 ML/MIN
GFR NON-AFRICAN AMERICAN: 34 ML/MIN
GFR SERPL CREATININE-BSD FRML MDRD: ABNORMAL ML/MIN/{1.73_M2}
GFR SERPL CREATININE-BSD FRML MDRD: ABNORMAL ML/MIN/{1.73_M2}
GLUCOSE BLD-MCNC: 204 MG/DL (ref 70–99)
GLUCOSE URINE: NEGATIVE
HCT VFR BLD CALC: 34.7 % (ref 40.7–50.3)
HEMOGLOBIN: 11 G/DL (ref 13–17)
KETONES, URINE: ABNORMAL
LEUKOCYTE ESTERASE, URINE: NEGATIVE
MCH RBC QN AUTO: 31.2 PG (ref 25.2–33.5)
MCHC RBC AUTO-ENTMCNC: 31.7 G/DL (ref 28.4–34.8)
MCV RBC AUTO: 98.3 FL (ref 82.6–102.9)
MUCUS: NORMAL
NITRITE, URINE: NEGATIVE
NRBC AUTOMATED: 0 PER 100 WBC
OTHER OBSERVATIONS UA: NORMAL
PDW BLD-RTO: 13.3 % (ref 11.8–14.4)
PH UA: 5.5 (ref 5–8)
PLATELET # BLD: 124 K/UL (ref 138–453)
PMV BLD AUTO: 10.8 FL (ref 8.1–13.5)
POTASSIUM SERPL-SCNC: 4.7 MMOL/L (ref 3.7–5.3)
PROTEIN UA: ABNORMAL
RBC # BLD: 3.53 M/UL (ref 4.21–5.77)
RBC UA: NORMAL /HPF (ref 0–4)
RENAL EPITHELIAL, UA: NORMAL /HPF
SODIUM BLD-SCNC: 139 MMOL/L (ref 135–144)
SPECIFIC GRAVITY UA: 1.02 (ref 1–1.03)
TRICHOMONAS: NORMAL
TURBIDITY: ABNORMAL
URINE HGB: ABNORMAL
UROBILINOGEN, URINE: NORMAL
WBC # BLD: 3.6 K/UL (ref 3.5–11.3)
WBC UA: NORMAL /HPF (ref 0–5)
YEAST: NORMAL

## 2018-09-10 PROCEDURE — 36415 COLL VENOUS BLD VENIPUNCTURE: CPT

## 2018-09-10 PROCEDURE — 81001 URINALYSIS AUTO W/SCOPE: CPT

## 2018-09-10 PROCEDURE — 80048 BASIC METABOLIC PNL TOTAL CA: CPT

## 2018-09-10 PROCEDURE — 87086 URINE CULTURE/COLONY COUNT: CPT

## 2018-09-10 PROCEDURE — 85027 COMPLETE CBC AUTOMATED: CPT

## 2018-09-10 PROCEDURE — P9603 ONE-WAY ALLOW PRORATED MILES: HCPCS

## 2018-09-11 LAB
CULTURE: NORMAL
Lab: NORMAL
SPECIMEN DESCRIPTION: NORMAL
STATUS: NORMAL